# Patient Record
Sex: MALE | Race: WHITE | NOT HISPANIC OR LATINO
[De-identification: names, ages, dates, MRNs, and addresses within clinical notes are randomized per-mention and may not be internally consistent; named-entity substitution may affect disease eponyms.]

---

## 2017-05-03 ENCOUNTER — APPOINTMENT (OUTPATIENT)
Dept: PULMONOLOGY | Facility: CLINIC | Age: 77
End: 2017-05-03

## 2017-05-03 VITALS
DIASTOLIC BLOOD PRESSURE: 80 MMHG | HEART RATE: 78 BPM | HEIGHT: 62 IN | SYSTOLIC BLOOD PRESSURE: 140 MMHG | BODY MASS INDEX: 39.2 KG/M2 | OXYGEN SATURATION: 94 % | WEIGHT: 213 LBS

## 2017-05-03 VITALS — OXYGEN SATURATION: 98 %

## 2017-05-03 DIAGNOSIS — M12.9 ARTHROPATHY, UNSPECIFIED: ICD-10-CM

## 2017-05-03 DIAGNOSIS — Z00.00 ENCOUNTER FOR GENERAL ADULT MEDICAL EXAMINATION W/OUT ABNORMAL FINDINGS: ICD-10-CM

## 2017-06-30 ENCOUNTER — APPOINTMENT (OUTPATIENT)
Dept: PULMONOLOGY | Facility: CLINIC | Age: 77
End: 2017-06-30

## 2018-01-15 ENCOUNTER — APPOINTMENT (OUTPATIENT)
Dept: CARDIOLOGY | Facility: CLINIC | Age: 78
End: 2018-01-15
Payer: MEDICARE

## 2018-01-15 PROCEDURE — 93000 ELECTROCARDIOGRAM COMPLETE: CPT

## 2018-01-15 PROCEDURE — 99214 OFFICE O/P EST MOD 30 MIN: CPT

## 2018-04-27 ENCOUNTER — APPOINTMENT (OUTPATIENT)
Dept: CARDIOLOGY | Facility: CLINIC | Age: 78
End: 2018-04-27

## 2018-06-05 ENCOUNTER — RECORD ABSTRACTING (OUTPATIENT)
Age: 78
End: 2018-06-05

## 2018-06-05 DIAGNOSIS — M10.9 GOUT, UNSPECIFIED: ICD-10-CM

## 2018-06-05 RX ORDER — DICLOFENAC SODIUM 50 MG/1
50 TABLET, DELAYED RELEASE ORAL
Qty: 60 | Refills: 0 | Status: COMPLETED | COMMUNITY
Start: 2018-04-26

## 2018-06-05 RX ORDER — MEMANTINE HYDROCHLORIDE 10 MG/1
10 TABLET, FILM COATED ORAL TWICE DAILY
Refills: 0 | Status: ACTIVE | COMMUNITY
Start: 2016-11-30

## 2018-06-05 RX ORDER — DONEPEZIL HYDROCHLORIDE 10 MG/1
10 TABLET ORAL DAILY
Refills: 0 | Status: ACTIVE | COMMUNITY
Start: 2017-01-26

## 2018-06-06 ENCOUNTER — APPOINTMENT (OUTPATIENT)
Dept: CARDIOLOGY | Facility: CLINIC | Age: 78
End: 2018-06-06
Payer: MEDICARE

## 2018-06-06 VITALS
SYSTOLIC BLOOD PRESSURE: 100 MMHG | HEIGHT: 62 IN | BODY MASS INDEX: 39.75 KG/M2 | DIASTOLIC BLOOD PRESSURE: 58 MMHG | HEART RATE: 83 BPM | WEIGHT: 216 LBS

## 2018-06-06 PROCEDURE — 93000 ELECTROCARDIOGRAM COMPLETE: CPT

## 2018-06-06 PROCEDURE — 99214 OFFICE O/P EST MOD 30 MIN: CPT

## 2018-06-06 RX ORDER — ALLOPURINOL 100 MG/1
100 TABLET ORAL
Refills: 0 | Status: ACTIVE | COMMUNITY
Start: 2016-11-11

## 2018-11-05 ENCOUNTER — APPOINTMENT (OUTPATIENT)
Dept: CARDIOLOGY | Facility: CLINIC | Age: 78
End: 2018-11-05
Payer: MEDICARE

## 2018-11-05 VITALS
BODY MASS INDEX: 39.38 KG/M2 | WEIGHT: 214 LBS | HEART RATE: 61 BPM | SYSTOLIC BLOOD PRESSURE: 126 MMHG | RESPIRATION RATE: 16 BRPM | HEIGHT: 62 IN | DIASTOLIC BLOOD PRESSURE: 78 MMHG

## 2018-11-05 DIAGNOSIS — Z86.79 PERSONAL HISTORY OF OTHER DISEASES OF THE CIRCULATORY SYSTEM: ICD-10-CM

## 2018-11-05 PROCEDURE — 99214 OFFICE O/P EST MOD 30 MIN: CPT

## 2018-11-05 PROCEDURE — 93000 ELECTROCARDIOGRAM COMPLETE: CPT

## 2018-11-05 RX ORDER — OMEPRAZOLE 20 MG/1
20 CAPSULE, DELAYED RELEASE ORAL
Qty: 90 | Refills: 3 | Status: DISCONTINUED | COMMUNITY
Start: 2018-04-26 | End: 2018-11-05

## 2018-11-05 NOTE — REASON FOR VISIT
[Follow-Up - Clinic] : a clinic follow-up of [FreeTextEntry1] : The patient is an 78-year-old white male who comes accompanied with his wife today for general cardiac checkup. He reports that he has been occasionally short of breath during certain activities especially when going up the stairs and has been rather sedentary. He also has severe arthritides of lower extremities and ambulates with a cane and;\par \par He denies chest pain palpitations dizziness or syncope;

## 2018-11-05 NOTE — HISTORY OF PRESENT ILLNESS
[FreeTextEntry1] : His wife feels that his excessive weight has been contributing to his occasional exertional dyspnea and arthritides.;\par \par \par Past history also includes hyperlipidemia, heart murmur, and mild dementia;

## 2018-11-05 NOTE — PHYSICAL EXAM
[Normal Conjunctiva] : the conjunctiva exhibited no abnormalities [Eyelids - No Xanthelasma] : the eyelids demonstrated no xanthelasmas [Normal Oral Mucosa] : normal oral mucosa [No Oral Pallor] : no oral pallor [No Oral Cyanosis] : no oral cyanosis [Normal Jugular Venous A Waves Present] : normal jugular venous A waves present [Normal Jugular Venous V Waves Present] : normal jugular venous V waves present [No Jugular Venous Narayan A Waves] : no jugular venous narayan A waves [Respiration, Rhythm And Depth] : normal respiratory rhythm and effort [Exaggerated Use Of Accessory Muscles For Inspiration] : no accessory muscle use [Auscultation Breath Sounds / Voice Sounds] : lungs were clear to auscultation bilaterally [FreeTextEntry1] : surgical scars on both knees [Skin Color & Pigmentation] : normal skin color and pigmentation [] : no rash [No Venous Stasis] : no venous stasis [Skin Lesions] : no skin lesions [No Skin Ulcers] : no skin ulcer [No Xanthoma] : no  xanthoma was observed [Oriented To Time, Place, And Person] : oriented to person, place, and time [Affect] : the affect was normal [Mood] : the mood was normal [No Anxiety] : not feeling anxious

## 2018-11-05 NOTE — ASSESSMENT
[FreeTextEntry1] : EKG shows normal sinus rhythm rate 86 with some poor R wave progression V1 to V3 and nonspecific T-wave flattening in the inferior lateral leads;\par \par \par \par In summary the patient is an 78-year-old gentleman with history of heart murmur, occasional exertional dyspnea and borderline abnormal EKG who has significant arthritides which could be contributing to his exertional symptoms;\par \par Plan:\par \par Recommendation schedule transthoracic echocardiogram and carotid duplex study by next visit to assess cardiac function, valvulopathy and carotid plaquing stenosis;\par \par Okay to continue current medical regimen at this time;\par \par Counseled the patient on low-carb weight reducing diet and discussed with his wife as well;\par \par Followup within 4-5 months or p.r.n.;

## 2019-04-02 ENCOUNTER — APPOINTMENT (OUTPATIENT)
Dept: CARDIOLOGY | Facility: CLINIC | Age: 79
End: 2019-04-02
Payer: MEDICARE

## 2019-04-02 PROCEDURE — 93880 EXTRACRANIAL BILAT STUDY: CPT

## 2019-04-02 PROCEDURE — 93306 TTE W/DOPPLER COMPLETE: CPT

## 2019-04-15 ENCOUNTER — NON-APPOINTMENT (OUTPATIENT)
Age: 79
End: 2019-04-15

## 2019-04-15 ENCOUNTER — APPOINTMENT (OUTPATIENT)
Dept: CARDIOLOGY | Facility: CLINIC | Age: 79
End: 2019-04-15
Payer: MEDICARE

## 2019-04-15 VITALS
SYSTOLIC BLOOD PRESSURE: 144 MMHG | WEIGHT: 218 LBS | RESPIRATION RATE: 16 BRPM | HEIGHT: 62 IN | BODY MASS INDEX: 40.12 KG/M2 | HEART RATE: 57 BPM | DIASTOLIC BLOOD PRESSURE: 83 MMHG

## 2019-04-15 PROCEDURE — 93000 ELECTROCARDIOGRAM COMPLETE: CPT

## 2019-04-15 PROCEDURE — 99214 OFFICE O/P EST MOD 30 MIN: CPT

## 2019-04-15 NOTE — ASSESSMENT
[FreeTextEntry1] : EKG demonstrates normal sinus rhythm at a rate of 57 with some borderline nonspecific ST-T changes, nonacute;\par \par In summary the patient is an 78-year-old gentleman with a history of recent echocardiogram demonstrating biatrial enlargement with preserved LV systolic function and mild L. LVH;\par Trace TR MR and mild PI with mildly elevated PA pressures;\par \par Carotid duplex study demonstrating mild calcified plaque on the left system and moderate to large calcified plaque on the right system with no significant obstructive flow;\par \par Plan:\par \par Patient recommended to attempt modest walking exercise as tolerated\par \par No additional cardiac workup indicated at this time\par \par Patient encouraged to continue current medical regimen;\par \par Followup to this office within 5 months or p.r.n.;;;

## 2019-04-15 NOTE — HISTORY OF PRESENT ILLNESS
[FreeTextEntry1] : ;He is accompanied with his wife today also for an exa\par \par His wife states he is very sedentary and does not do hardly any physical activity ;\par \par He ambulates with a cane ;\par \par States he's been taking his medications regularly;

## 2019-04-15 NOTE — REASON FOR VISIT
[Follow-Up - Clinic] : a clinic follow-up of [FreeTextEntry1] : The patient is an 78-year-old gentleman who presents back to the office today for general cardiac checkup. He has a known history of some exertional dyspnea at times but diffuse arthritides and lower extremities and obesity. He denies any significant symptoms of chest pain, palpitations, dizziness or syncope;

## 2019-04-15 NOTE — REVIEW OF SYSTEMS
[Shortness Of Breath] : shortness of breath [Lower Ext Edema] : lower extremity edema [Joint Pain] : joint pain [Joint Swelling] : joint swelling [Joint Stiffness] : joint stiffness [Limb Weakness (Paresis)] : limb weakness [Negative] : Heme/Lymph

## 2019-05-29 ENCOUNTER — APPOINTMENT (OUTPATIENT)
Dept: PULMONOLOGY | Facility: CLINIC | Age: 79
End: 2019-05-29
Payer: MEDICARE

## 2019-05-29 VITALS
OXYGEN SATURATION: 96 % | HEART RATE: 74 BPM | SYSTOLIC BLOOD PRESSURE: 140 MMHG | DIASTOLIC BLOOD PRESSURE: 70 MMHG | HEIGHT: 62 IN | BODY MASS INDEX: 40.48 KG/M2 | WEIGHT: 220 LBS

## 2019-05-29 PROCEDURE — 99214 OFFICE O/P EST MOD 30 MIN: CPT | Mod: 25

## 2019-05-29 PROCEDURE — 94010 BREATHING CAPACITY TEST: CPT

## 2019-08-06 ENCOUNTER — APPOINTMENT (OUTPATIENT)
Dept: CARDIOLOGY | Facility: CLINIC | Age: 79
End: 2019-08-06

## 2019-10-29 ENCOUNTER — APPOINTMENT (OUTPATIENT)
Dept: PULMONOLOGY | Facility: CLINIC | Age: 79
End: 2019-10-29

## 2021-01-11 ENCOUNTER — NON-APPOINTMENT (OUTPATIENT)
Age: 81
End: 2021-01-11

## 2021-01-18 ENCOUNTER — APPOINTMENT (OUTPATIENT)
Dept: CARDIOLOGY | Facility: CLINIC | Age: 81
End: 2021-01-18
Payer: MEDICARE

## 2021-01-18 ENCOUNTER — NON-APPOINTMENT (OUTPATIENT)
Age: 81
End: 2021-01-18

## 2021-01-18 VITALS
RESPIRATION RATE: 16 BRPM | BODY MASS INDEX: 41.77 KG/M2 | SYSTOLIC BLOOD PRESSURE: 130 MMHG | HEART RATE: 78 BPM | OXYGEN SATURATION: 96 % | TEMPERATURE: 95.3 F | DIASTOLIC BLOOD PRESSURE: 72 MMHG | HEIGHT: 62 IN | WEIGHT: 227 LBS

## 2021-01-18 PROCEDURE — 99214 OFFICE O/P EST MOD 30 MIN: CPT

## 2021-01-18 PROCEDURE — 93000 ELECTROCARDIOGRAM COMPLETE: CPT

## 2021-01-18 RX ORDER — RISPERIDONE 0.25 MG/1
0.25 TABLET, FILM COATED ORAL
Refills: 0 | Status: DISCONTINUED | COMMUNITY
Start: 2016-08-23 | End: 2021-01-18

## 2021-01-18 RX ORDER — SIMVASTATIN 20 MG/1
20 TABLET, FILM COATED ORAL
Qty: 90 | Refills: 3 | Status: DISCONTINUED | COMMUNITY
Start: 2016-03-01 | End: 2021-01-18

## 2021-01-18 RX ORDER — SERTRALINE 25 MG/1
25 TABLET, FILM COATED ORAL TWICE DAILY
Refills: 0 | Status: DISCONTINUED | COMMUNITY
Start: 2016-01-11 | End: 2021-01-18

## 2021-01-18 RX ORDER — LISINOPRIL 10 MG/1
10 TABLET ORAL
Qty: 30 | Refills: 0 | Status: DISCONTINUED | COMMUNITY
Start: 2021-01-06

## 2021-01-18 RX ORDER — ENALAPRIL MALEATE 10 MG/1
10 TABLET ORAL
Refills: 0 | Status: DISCONTINUED | COMMUNITY
Start: 2016-10-03 | End: 2021-01-18

## 2021-01-18 NOTE — REASON FOR VISIT
[FreeTextEntry1] : LILIANA FELDER is being seen for a clinic follow-up of. \par \par The patient is an 80-year-old gentleman who presents back to the office today for general cardiac checkup. He has a known history of some exertional dyspnea at times with diffuse arthritides and lower extremities and obesity.\par \par He is accompanied with his wife today reporting he continues to experience exertional shortness of breath mostly unchanged from his baseline.  He also notices some "wheezing" at times as well.  In addition, he has noticed some worsening of his lower extremities as well.\par \par His PCP (Dr. Ruffin) had recently switched the Enalapril with Lisinopril 10 mg.  He had also stopped his HCTZ 12.5 mg many months ago for a reason that is unknown.\par \par Patient denies associated symptoms such as CP, PND, orthopnea, palpitations, presyncope, syncope.

## 2021-01-18 NOTE — ASSESSMENT
[FreeTextEntry1] : EKG 1/18/2021:  The EKG illustrates sinus rhythm, rate of 78 bpm, nonspecific T wave abnormality, early R wave transition V1 to V2.  Essentially unchanged.\par \par Most recent Blood work (9/25/2020):  Creatinine (1.12) BUN (17), Potassium (4.3), Sodium (139), AST (20), ALT (16), Total Cholesterol (176), LDL (97), HDL (38), Triglycerides (203).\par \par

## 2021-01-18 NOTE — DISCUSSION/SUMMARY
[FreeTextEntry1] : 1).  Patient's lower extremities are now slightly edematous bilaterally;  he will begin taking Torsemide 20 mg daily for one week and then QOD after that.\par \par He will complete updated blood work including renal function and electrolytes one week prior to office follow up.  May try to have lab draw blood from his home for easier access. \par \par 2).  He is to complete a Transthoracic Echocardiogram in the near future to assess overall cardiac function, valvulopathy, signs of cardiomyopathy and pericardial effusion.\par \par 3).  Strongly encouraged follow up with Pulmonologist (Dr. Thrasher) tomorrow for evaluation and management of any lung related issues. ?chest xray, ?PFT's, ?medication.\par \par 4).  Immediately go to the emergency department and/or report any untoward symptoms to our office.\par \par 5).  Follow up with our office in 6 to 8 weeks or PRN.

## 2021-01-18 NOTE — PHYSICAL EXAM
[Normal Appearance] : normal appearance [General Appearance - In No Acute Distress] : no acute distress [Normal Conjunctiva] : the conjunctiva exhibited no abnormalities [Normal Oropharynx] : normal oropharynx [Normal Oral Mucosa] : normal oral mucosa [Normal Jugular Venous A Waves Present] : normal jugular venous A waves present [Normal Jugular Venous V Waves Present] : normal jugular venous V waves present [No Jugular Venous Narayan A Waves] : no jugular venous narayan A waves [] : no respiratory distress [Respiration, Rhythm And Depth] : normal respiratory rhythm and effort [Auscultation Breath Sounds / Voice Sounds] : lungs were clear to auscultation bilaterally [Heart Rate And Rhythm] : heart rate and rhythm were normal [Heart Sounds] : normal S1 and S2 [Bowel Sounds] : normal bowel sounds [Cyanosis, Localized] : no localized cyanosis [Nail Clubbing] : no clubbing of the fingernails [Skin Color & Pigmentation] : normal skin color and pigmentation [Oriented To Time, Place, And Person] : oriented to person, place, and time [Impaired Insight] : insight and judgment were intact [Affect] : the affect was normal [FreeTextEntry1] : use wheel chair at times and/or cane.

## 2021-01-19 ENCOUNTER — APPOINTMENT (OUTPATIENT)
Dept: PULMONOLOGY | Facility: CLINIC | Age: 81
End: 2021-01-19
Payer: MEDICARE

## 2021-01-19 VITALS
WEIGHT: 227 LBS | HEART RATE: 59 BPM | SYSTOLIC BLOOD PRESSURE: 140 MMHG | HEIGHT: 62 IN | DIASTOLIC BLOOD PRESSURE: 78 MMHG | OXYGEN SATURATION: 97 % | BODY MASS INDEX: 41.77 KG/M2

## 2021-01-19 DIAGNOSIS — Z23 ENCOUNTER FOR IMMUNIZATION: ICD-10-CM

## 2021-01-19 PROCEDURE — 99215 OFFICE O/P EST HI 40 MIN: CPT

## 2021-01-19 RX ORDER — ALBUTEROL SULFATE 2.5 MG/3ML
(2.5 MG/3ML) SOLUTION RESPIRATORY (INHALATION)
Qty: 3 | Refills: 3 | Status: ACTIVE | COMMUNITY
Start: 2021-01-19 | End: 1900-01-01

## 2021-01-19 RX ORDER — SERTRALINE HYDROCHLORIDE 50 MG/1
50 TABLET, FILM COATED ORAL
Qty: 90 | Refills: 0 | Status: ACTIVE | COMMUNITY
Start: 2020-07-20

## 2021-01-19 RX ORDER — RISPERIDONE 0.5 MG/1
0.5 TABLET, FILM COATED ORAL
Qty: 90 | Refills: 0 | Status: ACTIVE | COMMUNITY
Start: 2020-12-22

## 2021-01-28 LAB
DEPRECATED D DIMER PPP IA-ACNC: 211 NG/ML DDU
SARS-COV-2 N GENE NPH QL NAA+PROBE: NOT DETECTED

## 2021-02-02 ENCOUNTER — APPOINTMENT (OUTPATIENT)
Dept: DISASTER EMERGENCY | Facility: CLINIC | Age: 81
End: 2021-02-02

## 2021-02-05 ENCOUNTER — APPOINTMENT (OUTPATIENT)
Dept: PULMONOLOGY | Facility: CLINIC | Age: 81
End: 2021-02-05

## 2021-02-05 ENCOUNTER — APPOINTMENT (OUTPATIENT)
Dept: PULMONOLOGY | Facility: CLINIC | Age: 81
End: 2021-02-05
Payer: MEDICARE

## 2021-02-05 VITALS
DIASTOLIC BLOOD PRESSURE: 72 MMHG | SYSTOLIC BLOOD PRESSURE: 122 MMHG | HEART RATE: 87 BPM | WEIGHT: 223 LBS | BODY MASS INDEX: 40.79 KG/M2 | OXYGEN SATURATION: 98 %

## 2021-02-05 DIAGNOSIS — K14.8 OTHER DISEASES OF TONGUE: ICD-10-CM

## 2021-02-05 PROCEDURE — 99214 OFFICE O/P EST MOD 30 MIN: CPT

## 2021-02-05 RX ORDER — BUDESONIDE 0.5 MG/2ML
0.5 INHALANT ORAL TWICE DAILY
Qty: 12 | Refills: 5 | Status: ACTIVE | COMMUNITY
Start: 2021-02-05 | End: 1900-01-01

## 2021-02-17 ENCOUNTER — APPOINTMENT (OUTPATIENT)
Dept: DISASTER EMERGENCY | Facility: CLINIC | Age: 81
End: 2021-02-17

## 2021-02-18 LAB — SARS-COV-2 N GENE NPH QL NAA+PROBE: NOT DETECTED

## 2021-02-19 ENCOUNTER — APPOINTMENT (OUTPATIENT)
Dept: PULMONOLOGY | Facility: CLINIC | Age: 81
End: 2021-02-19
Payer: MEDICARE

## 2021-02-19 VITALS — OXYGEN SATURATION: 96 % | HEART RATE: 76 BPM | DIASTOLIC BLOOD PRESSURE: 70 MMHG | SYSTOLIC BLOOD PRESSURE: 116 MMHG

## 2021-02-19 VITALS — BODY MASS INDEX: 42.07 KG/M2 | TEMPERATURE: 98 F | WEIGHT: 230 LBS

## 2021-02-19 PROCEDURE — 94727 GAS DIL/WSHOT DETER LNG VOL: CPT

## 2021-02-19 PROCEDURE — 94010 BREATHING CAPACITY TEST: CPT

## 2021-02-19 PROCEDURE — 99214 OFFICE O/P EST MOD 30 MIN: CPT | Mod: 25

## 2021-02-19 PROCEDURE — 94729 DIFFUSING CAPACITY: CPT

## 2021-02-19 PROCEDURE — 85018 HEMOGLOBIN: CPT | Mod: QW

## 2021-02-24 ENCOUNTER — APPOINTMENT (OUTPATIENT)
Dept: CARDIOLOGY | Facility: CLINIC | Age: 81
End: 2021-02-24
Payer: MEDICARE

## 2021-02-24 PROCEDURE — 93306 TTE W/DOPPLER COMPLETE: CPT

## 2021-02-24 RX ORDER — PERFLUTREN 6.52 MG/ML
6.52 INJECTION, SUSPENSION INTRAVENOUS
Qty: 2 | Refills: 0 | Status: COMPLETED | OUTPATIENT
Start: 2021-02-24

## 2021-02-24 RX ADMIN — PERFLUTREN MG/ML: 6.52 INJECTION, SUSPENSION INTRAVENOUS at 00:00

## 2021-02-25 ENCOUNTER — LABORATORY RESULT (OUTPATIENT)
Age: 81
End: 2021-02-25

## 2021-03-24 ENCOUNTER — APPOINTMENT (OUTPATIENT)
Dept: CARDIOLOGY | Facility: CLINIC | Age: 81
End: 2021-03-24
Payer: MEDICARE

## 2021-03-24 ENCOUNTER — NON-APPOINTMENT (OUTPATIENT)
Age: 81
End: 2021-03-24

## 2021-03-24 VITALS
SYSTOLIC BLOOD PRESSURE: 114 MMHG | DIASTOLIC BLOOD PRESSURE: 78 MMHG | HEART RATE: 64 BPM | WEIGHT: 223 LBS | HEIGHT: 62 IN | BODY MASS INDEX: 41.04 KG/M2 | TEMPERATURE: 97.2 F | RESPIRATION RATE: 16 BRPM

## 2021-03-24 DIAGNOSIS — J98.6 DISORDERS OF DIAPHRAGM: ICD-10-CM

## 2021-03-24 PROCEDURE — 99214 OFFICE O/P EST MOD 30 MIN: CPT

## 2021-03-24 PROCEDURE — 93000 ELECTROCARDIOGRAM COMPLETE: CPT

## 2021-03-24 NOTE — REASON FOR VISIT
[Follow-Up - Clinic] : a clinic follow-up of [FreeTextEntry1] : The patient is an 80-year-old white male with a known history for mild dementia and underlying obstructive sleep apnea (no CPAP), obesity with pulmonary hypertension, and chronic complaints of exertional dyspnea.\par \par He and his wife reports some audible wheezing when he tries to do any meaningful walking or activity;\par \par Yet, there has been no PND or orthopnea;\par He denies chest pain, palpitations, dizziness or syncope;\par

## 2021-03-24 NOTE — ASSESSMENT
[FreeTextEntry1] : EKG demonstrating normal sinus rhythm at a rate of 64. Nonspecific T-wave changes in leads 3 and aVF;  no acute change;\par \par In summary this 80-year-old gentleman has a history of some mild dementia and obesity with exertional dyspnea which could be multifactorial in origin including his chronic arthritides and lower spinal pain which makes it difficult for this patient to ambulate bringing on more shortness of breath;\par \par Plan:\par \par Emphasis at this time would be to treat his lower back pain syndrome and efforts at helping him reduce some of his weight;\par \par Doubt significant cardiac cause of this, however could consider cardiac catheterization with right and left heart cath-if patient and his wife are agreeable in the future and symptoms take a worsening course;\par \par Will continue to monitor patient on periodic basis in the office;

## 2021-03-24 NOTE — HISTORY OF PRESENT ILLNESS
[FreeTextEntry1] : He uses bronchodilators and nebulizers only intermittently at home;\par He was recommended to use O2 nasal cannula at times of exertion but does not seem to use it much;\par Has been seeing Dr. Thrasher from the pulmonary standpoint periodically;\par \par Cardiac workup in the past: Shows recent echo from 2/24/21 with preserved systolic function of the left ventricle with mild LVH and EF range of 55-60%; no evidence of diastolic dysfunction; moderate aortic sclerosis without stenosis. Trace MR and TR. No pericardial effusion;\par \par Last nuclear stress test in 2017 was negative for ischemia on the myocardial perfusion images;\par

## 2021-03-24 NOTE — REVIEW OF SYSTEMS
[Recent Weight Gain (___ Lbs)] : recent [unfilled] ~Ulb weight gain [Dyspnea on exertion] : dyspnea during exertion [Wheezing] : wheezing [Negative] : Heme/Lymph

## 2021-04-27 ENCOUNTER — APPOINTMENT (OUTPATIENT)
Dept: PULMONOLOGY | Facility: CLINIC | Age: 81
End: 2021-04-27
Payer: MEDICARE

## 2021-04-27 DIAGNOSIS — R06.1 STRIDOR: ICD-10-CM

## 2021-04-27 PROCEDURE — 99443: CPT | Mod: 95

## 2021-05-18 PROBLEM — R91.1 LUNG NODULE: Status: ACTIVE | Noted: 2021-05-18

## 2021-05-24 ENCOUNTER — APPOINTMENT (OUTPATIENT)
Dept: THORACIC SURGERY | Facility: CLINIC | Age: 81
End: 2021-05-24
Payer: MEDICARE

## 2021-05-24 VITALS
WEIGHT: 225 LBS | DIASTOLIC BLOOD PRESSURE: 76 MMHG | HEART RATE: 69 BPM | RESPIRATION RATE: 16 BRPM | HEIGHT: 60 IN | SYSTOLIC BLOOD PRESSURE: 123 MMHG | OXYGEN SATURATION: 96 % | BODY MASS INDEX: 44.17 KG/M2

## 2021-05-24 DIAGNOSIS — R91.1 SOLITARY PULMONARY NODULE: ICD-10-CM

## 2021-05-24 PROCEDURE — 99204 OFFICE O/P NEW MOD 45 MIN: CPT

## 2021-05-24 NOTE — REVIEW OF SYSTEMS
[Feeling Poorly] : feeling poorly [Feeling Tired] : feeling tired [Lower Ext Edema] : lower extremity edema [Shortness Of Breath] : shortness of breath [SOB on Exertion] : shortness of breath during exertion [Negative] : Heme/Lymph [Chest Pain] : no chest pain [Palpitations] : no palpitations [Cough] : no cough

## 2021-05-24 NOTE — CONSULT LETTER
[Dear  ___] : Dear  [unfilled], [Courtesy Letter:] : I had the pleasure of seeing your patient, [unfilled], in my office today. [Please see my note below.] : Please see my note below. [Consult Closing:] : Thank you very much for allowing me to participate in the care of this patient.  If you have any questions, please do not hesitate to contact me. [Sincerely,] : Sincerely, [FreeTextEntry2] : Dr. Jack Thrasher  [FreeTextEntry3] : Michel Miller MD\par Department of Cardiovascular and Thoracic Surgery\par \par [employer] [employer] [facility]

## 2021-05-24 NOTE — ASSESSMENT
[FreeTextEntry1] : Ramos is an 80-year-old male with a history of shortness of breath on exertion recently found to have an elevated right hemidiaphragm. There may also be some bronchial narrowing in the lower lobe. I do believe bronchoscopy for evaluation of the airways as appropriate and he will be scheduled shortly.\par \par Thank you for allowing me to participate in the care of your patient.\par \par 45 minutes was spent during this encounter.\par \par Michel Miller MD\par Department of Cardiovascular and Thoracic Surgery\par \par [employer] [employer] [facility]\par

## 2021-05-24 NOTE — PHYSICAL EXAM
[General Appearance - Alert] : alert [General Appearance - In No Acute Distress] : in no acute distress [FreeTextEntry1] : He is diminished on the right [Heart Rate And Rhythm] : heart rate was normal and rhythm regular [Heart Sounds] : normal S1 and S2 [Heart Sounds Gallop] : no gallops [Murmurs] : no murmurs [Heart Sounds Pericardial Friction Rub] : no pericardial rub [Examination Of The Chest] : the chest was normal in appearance [Chest Visual Inspection Thoracic Asymmetry] : no chest asymmetry [Diminished Respiratory Excursion] : normal chest expansion [No Focal Deficits] : no focal deficits [Oriented To Time, Place, And Person] : oriented to person, place, and time [Impaired Insight] : insight and judgment were intact [Affect] : the affect was normal

## 2021-05-24 NOTE — HISTORY OF PRESENT ILLNESS
[FreeTextEntry1] : Mr. FELDER is a 80 year old male referred by Dr.Charles Thrasher for a abnormal  5.6.21 Non- Contrast CT Chest Scan from [facility]. This demonstrated a elevation of the  right hemidiaphragm with associated compressive atelectasis seen in the right middle lobe and at the right lung base. She  is here to discuss candidacy for a Flexible Bronchoscopy.

## 2021-05-24 NOTE — DATA REVIEWED
[FreeTextEntry1] : 5.6.21 Non- Contrast CT Chest Scan from [facility] \par - Elevation of the right hemidiaphragm again seen with associated compressive atelectasis seen in the right middle lobe and at the right lung base. These findings appear unchanged compared with the prior study \par \par 2.19.21 PFTs from [facility] [place] \par -FEV1 81.1\par \par 1.22.21 Non- Contrast CT Chest Scan from [facility] \par -Somewhat limited  examination  due to motion artifact \par -Diffuse bronchial wall thickening as well inflammatory airway disease. Apparent  mild narrowing of  the right middle lobe bronchus, possibly related to shallow  inspiratory effort and  elevation  of the right hemidiaphragm. Minimal soft tissue attenuation  right lower lobe bronchus, probably related  to secretions \par -Subsegmental consolidation  right middle lobe  and minimally in the right lower lobe likely related to  compressive atelectasis \par -This is also in the inferior lingular lobe \par \par

## 2021-06-02 ENCOUNTER — OUTPATIENT (OUTPATIENT)
Dept: OUTPATIENT SERVICES | Facility: HOSPITAL | Age: 81
LOS: 1 days | End: 2021-06-02
Payer: MEDICARE

## 2021-06-02 VITALS
DIASTOLIC BLOOD PRESSURE: 74 MMHG | SYSTOLIC BLOOD PRESSURE: 120 MMHG | HEART RATE: 88 BPM | HEIGHT: 60 IN | TEMPERATURE: 97 F | WEIGHT: 223.55 LBS | RESPIRATION RATE: 20 BRPM

## 2021-06-02 DIAGNOSIS — Z01.818 ENCOUNTER FOR OTHER PREPROCEDURAL EXAMINATION: ICD-10-CM

## 2021-06-02 DIAGNOSIS — Z13.89 ENCOUNTER FOR SCREENING FOR OTHER DISORDER: ICD-10-CM

## 2021-06-02 DIAGNOSIS — Z29.9 ENCOUNTER FOR PROPHYLACTIC MEASURES, UNSPECIFIED: ICD-10-CM

## 2021-06-02 DIAGNOSIS — Z98.49 CATARACT EXTRACTION STATUS, UNSPECIFIED EYE: Chronic | ICD-10-CM

## 2021-06-02 DIAGNOSIS — R91.1 SOLITARY PULMONARY NODULE: ICD-10-CM

## 2021-06-02 DIAGNOSIS — Z90.49 ACQUIRED ABSENCE OF OTHER SPECIFIED PARTS OF DIGESTIVE TRACT: Chronic | ICD-10-CM

## 2021-06-02 DIAGNOSIS — I10 ESSENTIAL (PRIMARY) HYPERTENSION: ICD-10-CM

## 2021-06-02 DIAGNOSIS — Z96.659 PRESENCE OF UNSPECIFIED ARTIFICIAL KNEE JOINT: Chronic | ICD-10-CM

## 2021-06-02 LAB
ALBUMIN SERPL ELPH-MCNC: 4 G/DL — SIGNIFICANT CHANGE UP (ref 3.3–5.2)
ALP SERPL-CCNC: 58 U/L — SIGNIFICANT CHANGE UP (ref 40–120)
ALT FLD-CCNC: 17 U/L — SIGNIFICANT CHANGE UP
ANION GAP SERPL CALC-SCNC: 9 MMOL/L — SIGNIFICANT CHANGE UP (ref 5–17)
APTT BLD: 30.6 SEC — SIGNIFICANT CHANGE UP (ref 27.5–35.5)
AST SERPL-CCNC: 22 U/L — SIGNIFICANT CHANGE UP
BASOPHILS # BLD AUTO: 0.03 K/UL — SIGNIFICANT CHANGE UP (ref 0–0.2)
BASOPHILS NFR BLD AUTO: 0.4 % — SIGNIFICANT CHANGE UP (ref 0–2)
BILIRUB SERPL-MCNC: 0.3 MG/DL — LOW (ref 0.4–2)
BUN SERPL-MCNC: 44.2 MG/DL — HIGH (ref 8–20)
CALCIUM SERPL-MCNC: 9.1 MG/DL — SIGNIFICANT CHANGE UP (ref 8.6–10.2)
CHLORIDE SERPL-SCNC: 101 MMOL/L — SIGNIFICANT CHANGE UP (ref 98–107)
CO2 SERPL-SCNC: 28 MMOL/L — SIGNIFICANT CHANGE UP (ref 22–29)
CREAT SERPL-MCNC: 1.74 MG/DL — HIGH (ref 0.5–1.3)
EOSINOPHIL # BLD AUTO: 0.46 K/UL — SIGNIFICANT CHANGE UP (ref 0–0.5)
EOSINOPHIL NFR BLD AUTO: 6.6 % — HIGH (ref 0–6)
GLUCOSE SERPL-MCNC: 108 MG/DL — HIGH (ref 70–99)
HCT VFR BLD CALC: 41.1 % — SIGNIFICANT CHANGE UP (ref 39–50)
HGB BLD-MCNC: 13.4 G/DL — SIGNIFICANT CHANGE UP (ref 13–17)
IMM GRANULOCYTES NFR BLD AUTO: 0.4 % — SIGNIFICANT CHANGE UP (ref 0–1.5)
INR BLD: 0.98 RATIO — SIGNIFICANT CHANGE UP (ref 0.88–1.16)
LYMPHOCYTES # BLD AUTO: 2.68 K/UL — SIGNIFICANT CHANGE UP (ref 1–3.3)
LYMPHOCYTES # BLD AUTO: 38.6 % — SIGNIFICANT CHANGE UP (ref 13–44)
MCHC RBC-ENTMCNC: 30 PG — SIGNIFICANT CHANGE UP (ref 27–34)
MCHC RBC-ENTMCNC: 32.6 GM/DL — SIGNIFICANT CHANGE UP (ref 32–36)
MCV RBC AUTO: 91.9 FL — SIGNIFICANT CHANGE UP (ref 80–100)
MONOCYTES # BLD AUTO: 0.38 K/UL — SIGNIFICANT CHANGE UP (ref 0–0.9)
MONOCYTES NFR BLD AUTO: 5.5 % — SIGNIFICANT CHANGE UP (ref 2–14)
NEUTROPHILS # BLD AUTO: 3.36 K/UL — SIGNIFICANT CHANGE UP (ref 1.8–7.4)
NEUTROPHILS NFR BLD AUTO: 48.5 % — SIGNIFICANT CHANGE UP (ref 43–77)
PLATELET # BLD AUTO: 175 K/UL — SIGNIFICANT CHANGE UP (ref 150–400)
POTASSIUM SERPL-MCNC: 4.9 MMOL/L — SIGNIFICANT CHANGE UP (ref 3.5–5.3)
POTASSIUM SERPL-SCNC: 4.9 MMOL/L — SIGNIFICANT CHANGE UP (ref 3.5–5.3)
PROT SERPL-MCNC: 6.8 G/DL — SIGNIFICANT CHANGE UP (ref 6.6–8.7)
PROTHROM AB SERPL-ACNC: 11.4 SEC — SIGNIFICANT CHANGE UP (ref 10.6–13.6)
RBC # BLD: 4.47 M/UL — SIGNIFICANT CHANGE UP (ref 4.2–5.8)
RBC # FLD: 13.5 % — SIGNIFICANT CHANGE UP (ref 10.3–14.5)
SODIUM SERPL-SCNC: 138 MMOL/L — SIGNIFICANT CHANGE UP (ref 135–145)
WBC # BLD: 6.94 K/UL — SIGNIFICANT CHANGE UP (ref 3.8–10.5)
WBC # FLD AUTO: 6.94 K/UL — SIGNIFICANT CHANGE UP (ref 3.8–10.5)

## 2021-06-02 PROCEDURE — 93005 ELECTROCARDIOGRAM TRACING: CPT

## 2021-06-02 PROCEDURE — 85025 COMPLETE CBC W/AUTO DIFF WBC: CPT

## 2021-06-02 PROCEDURE — 36415 COLL VENOUS BLD VENIPUNCTURE: CPT

## 2021-06-02 PROCEDURE — 80053 COMPREHEN METABOLIC PANEL: CPT

## 2021-06-02 PROCEDURE — 85610 PROTHROMBIN TIME: CPT

## 2021-06-02 PROCEDURE — 93010 ELECTROCARDIOGRAM REPORT: CPT

## 2021-06-02 PROCEDURE — G0463: CPT

## 2021-06-02 PROCEDURE — 85730 THROMBOPLASTIN TIME PARTIAL: CPT

## 2021-06-02 RX ORDER — SIMVASTATIN 20 MG/1
1 TABLET, FILM COATED ORAL
Qty: 0 | Refills: 0 | DISCHARGE

## 2021-06-02 RX ORDER — MELOXICAM 15 MG/1
1 TABLET ORAL
Qty: 0 | Refills: 0 | DISCHARGE

## 2021-06-02 RX ORDER — DONEPEZIL HYDROCHLORIDE 10 MG/1
1 TABLET, FILM COATED ORAL
Qty: 0 | Refills: 0 | DISCHARGE

## 2021-06-02 RX ORDER — ALLOPURINOL 300 MG
1 TABLET ORAL
Qty: 0 | Refills: 0 | DISCHARGE

## 2021-06-02 RX ORDER — TERAZOSIN HYDROCHLORIDE 10 MG/1
1 CAPSULE ORAL
Qty: 0 | Refills: 0 | DISCHARGE

## 2021-06-02 RX ORDER — ASPIRIN/CALCIUM CARB/MAGNESIUM 324 MG
1 TABLET ORAL
Qty: 0 | Refills: 0 | DISCHARGE

## 2021-06-02 RX ORDER — RISPERIDONE 4 MG/1
1 TABLET ORAL
Qty: 0 | Refills: 0 | DISCHARGE

## 2021-06-02 RX ORDER — OMEGA-3 ACID ETHYL ESTERS 1 G
0 CAPSULE ORAL
Qty: 0 | Refills: 0 | DISCHARGE

## 2021-06-02 RX ORDER — SERTRALINE 25 MG/1
1 TABLET, FILM COATED ORAL
Qty: 0 | Refills: 0 | DISCHARGE

## 2021-06-02 RX ORDER — OMEPRAZOLE 10 MG/1
1 CAPSULE, DELAYED RELEASE ORAL
Qty: 0 | Refills: 0 | DISCHARGE

## 2021-06-02 RX ORDER — MEMANTINE HYDROCHLORIDE 10 MG/1
0 TABLET ORAL
Qty: 0 | Refills: 0 | DISCHARGE

## 2021-06-02 RX ORDER — TAMSULOSIN HYDROCHLORIDE 0.4 MG/1
1 CAPSULE ORAL
Qty: 0 | Refills: 0 | DISCHARGE

## 2021-06-02 NOTE — ASU PATIENT PROFILE, ADULT - LEARNING ASSESSMENT (PATIENT) ADDITIONAL COMMENTS
NP, instructed pt and wife, on pre-op instructions/teaching, tips for safer surgery, pain management scale, pt and wife verbalized understanding of all instructions given.

## 2021-06-02 NOTE — H&P PST ADULT - NSICDXFAMILYHX_GEN_ALL_CORE_FT
FAMILY HISTORY:  Father  Still living? Unknown  FH: diabetes mellitus, Age at diagnosis: Age Unknown    Sibling  Still living? Unknown  FH: breast cancer, Age at diagnosis: Age Unknown

## 2021-06-02 NOTE — H&P PST ADULT - NEGATIVE GASTROINTESTINAL SYMPTOMS
no nausea/no vomiting/no diarrhea/no constipation/no change in bowel habits/no abdominal pain/no melena/no hematochezia

## 2021-06-02 NOTE — H&P PST ADULT - NSICDXPROBLEM_GEN_ALL_CORE_FT
PROBLEM DIAGNOSES  Problem: Hypertension  Assessment and Plan: preop assessment, follow with PCP, medical clearance pending     Problem: Solitary pulmonary nodule  Assessment and Plan: preop assessment, medical clearance pending, flex bronch on 6/9    Problem: Screening for substance abuse  Assessment and Plan: ort score 1, low risk for substance abuse    Problem: Need for prophylactic measure  Assessment and Plan: caprini score 5, moderate risk for dvt, SCD ordered, surgical team to assess for dvt prophylaxis

## 2021-06-02 NOTE — ASU PATIENT PROFILE, ADULT - PAIN RATING AT ACTIVITY
Dr. Muhammad (Nephrology)  Office [phone]  Cell [phone]  [facility]  Cell [phone]      Patient is a 67y old  Female who presents with a chief complaint of     Patient seen and examined at bedside. No chest pain/sob    VITALS:  T(F): 97.2 (10-20-17 @ 05:24), Max: 99.1 (10-19-17 @ 22:21)  HR: 79 (10-20-17 @ 05:24)  BP: 117/60 (10-20-17 @ 05:24)  RR: 18 (10-20-17 @ 05:24)  SpO2: 98% (10-20-17 @ 05:24)  Wt(kg): --        PHYSICAL EXAM:  Constitutional: NAD  Neck: No JVD  Respiratory: CTAB, no wheezes, rales or rhonchi  Cardiovascular: S1, S2, RRR  Gastrointestinal: BS+, soft, NT/ND  Extremities: No peripheral edema    Hospital Medications:   MEDICATIONS  (STANDING):  acetylcysteine  Oral Solution 1200 milliGRAM(s) Oral every 12 hours  aspirin enteric coated 81 milliGRAM(s) Oral daily  atorvastatin 40 milliGRAM(s) Oral at bedtime  dextrose 5%. 1000 milliLiter(s) (50 mL/Hr) IV Continuous <Continuous>  dextrose 50% Injectable 12.5 Gram(s) IV Push once  dextrose 50% Injectable 25 Gram(s) IV Push once  dextrose 50% Injectable 25 Gram(s) IV Push once  docusate sodium 100 milliGRAM(s) Oral daily  famotidine    Tablet 20 milliGRAM(s) Oral daily  influenza   Vaccine 0.5 milliLiter(s) IntraMuscular once  insulin glargine Injectable (LANTUS) 65 Unit(s) SubCutaneous at bedtime  insulin lispro (HumaLOG) corrective regimen sliding scale   SubCutaneous Before meals and at bedtime  insulin lispro Injectable (HumaLOG) 24 Unit(s) SubCutaneous three times a day with meals  levothyroxine 50 MICROGram(s) Oral daily  metoprolol 25 milliGRAM(s) Oral two times a day  montelukast 10 milliGRAM(s) Oral at bedtime  senna 2 Tablet(s) Oral at bedtime  sodium bicarbonate  Infusion 0.23 mEq/kG/Hr (75 mL/Hr) IV Continuous <Continuous>  sodium bicarbonate  Infusion 0.23 mEq/kG/Hr (75 mL/Hr) IV Continuous <Continuous>  sodium chloride 0.9% lock flush 3 milliLiter(s) IV Push every 8 hours  sodium chloride 0.9%. 500 milliLiter(s) (100 mL/Hr) IV Continuous <Continuous>      LABS:  10-20    142  |  104  |  39<H>  ----------------------------<  143<H>  4.0   |  21<L>  |  1.70<H>    Ca    9.8      20 Oct 2017 05:35    TPro  7.2  /  Alb  3.6  /  TBili  0.6  /  DBili  0.1  /  AST  43<H>  /  ALT  30  /  AlkPhos  72  10-19    Creatinine Trend: 1.70 <--, 1.34 <--, 1.63 <--, 1.76 <--, 1.81 <--, 1.71 <--, 1.49 <--                                10.5   13.54 )-----------( 388      ( 20 Oct 2017 05:35 )             33.3     Urine Studies:  Urinalysis - [10-18-17 @ 11:15]      Color PLYEL / Appearance CLEAR / SG 1.014 / pH 6.5      Gluc 500 / Ketone NEGATIVE  / Bili NEGATIVE / Urobili NORMAL       Blood NEGATIVE / Protein 100 / Leuk Est NEGATIVE / Nitrite NEGATIVE      RBC 0-2 / WBC 0-2 / Hyaline  / Gran  / Sq Epi OCC / Non Sq Epi  / Bacteria       HbA1c 10.4      [10-13-17 @ 08:45]        RADIOLOGY & ADDITIONAL STUDIES: 10

## 2021-06-02 NOTE — H&P PST ADULT - RS GEN PE MLT RESP DETAILS PC
respirations non-labored/no rales/no rhonchi/wheezes respirations non-labored/no rales/no rhonchi/diminished breath sounds, L/diminished breath sounds, R

## 2021-06-02 NOTE — H&P PST ADULT - ASSESSMENT
79 yo M 79 yo M PMH of HTN, HLD, BPH, GERD, morbid obesity (BMI 50.1), GINETTE (not on CPAP), presents with c/o abnormal non-contrast CT chest from [facility] done on 21 that demonstrated an elevation of the right hemidiaphragm with associated compressive atelectasis seen in the right middle lobe and at the right lung base. Patient reports recently increased shortness of breath with exertion. He denies fevers, chills, cough, chest pain, palpitations, dizziness. Denies Hx of smoking. Accompanied today by wife. Ambulates with cane or walker. Preop assessment prior to Flexible Bronchoscopy w/Dr Miller on       OPIOID RISK TOOL    LAYLA EACH BOX THAT APPLIES AND ADD TOTALS AT THE END    FAMILY HISTORY OF SUBSTANCE ABUSE                 FEMALE         MALE                                                Alcohol                             [  ]1 pt          [  ]3pts                                               Illegal Durgs                     [  ]2 pts        [  ]3pts                                               Rx Drugs                           [  ]4 pts        [  ]4 pts    PERSONAL HISTORY OF SUBSTANCE ABUSE                                                                                          Alcohol                             [  ]3 pts       [  ]3 pts                                               Illegal Drugs                     [  ]4 pts        [  ]4 pts                                               Rx Drugs                           [  ]5 pts        [  ]5 pts    AGE BETWEEN 16-45 YEARS                                      [  ]1 pt         [  ]1 pt    HISTORY OF PREADOLESCENT   SEXUAL ABUSE                                                             [  ]3 pts        [  ]0pts    PSYCHOLOGICAL DISEASE                     ADD, OCD, Bipolar, Schizophrenia        [  ]2 pts         [  ]2 pts                      Depression                                               [x  ]1 pt           [  ]1 pt           SCORING TOTAL   (add numbers and type here)              ( 1 )                                     A score of 3 or lower indicated LOW risk for future opioid abuse  A score of 4 to 7 indicated moderate risk for future opioid abuse  A score of 8 or higher indicates a high risk for opioid abuse    CAPRINI VTE 2.0 SCORE [CLOT updated 2019]    AGE RELATED RISK FACTORS                                                       MOBILITY RELATED FACTORS  [ ] Age 41-60 years                                            (1 Point)                    [ ] Bed rest                                                        (1 Point)  [ ] Age: 61-74 years                                           (2 Points)                  [ ] Plaster cast                                                   (2 Points)  [x ] Age= 75 years                                              (3 Points)                    [ ] Bed bound for more than 72 hours                 (2 Points)    DISEASE RELATED RISK FACTORS                                               GENDER SPECIFIC FACTORS  [ ] Edema in the lower extremities                       (1 Point)              [ ] Pregnancy                                                     (1 Point)  [ ] Varicose veins                                               (1 Point)                     [ ] Post-partum < 6 weeks                                   (1 Point)             [ x] BMI > 25 Kg/m2                                            (1 Point)                     [ ] Hormonal therapy  or oral contraception          (1 Point)                 [ ] Sepsis (in the previous month)                        (1 Point)               [ ] History of pregnancy complications                 (1 point)  [ ] Pneumonia or serious lung disease                                               [ ] Unexplained or recurrent                     (1 Point)           (in the previous month)                               (1 Point)  [ ] Abnormal pulmonary function test                     (1 Point)                 SURGERY RELATED RISK FACTORS  [ ] Acute myocardial infarction                              (1 Point)               [ ]  Section                                             (1 Point)  [ ] Congestive heart failure (in the previous month)  (1 Point)      [x ] Minor surgery                                                  (1 Point)   [ ] Inflammatory bowel disease                             (1 Point)               [ ] Arthroscopic surgery                                        (2 Points)  [ ] Central venous access                                      (2 Points)                [ ] General surgery lasting more than 45 minutes (2 points)  [ ] Malignancy- Present or previous                   (2 Points)                [ ] Elective arthroplasty                                         (5 points)    [ ] Stroke (in the previous month)                          (5 Points)                                                                                                                                                           HEMATOLOGY RELATED FACTORS                                                 TRAUMA RELATED RISK FACTORS  [ ] Prior episodes of VTE                                     (3 Points)                [ ] Fracture of the hip, pelvis, or leg                       (5 Points)  [ ] Positive family history for VTE                         (3 Points)             [ ] Acute spinal cord injury (in the previous month)  (5 Points)  [ ] Prothrombin 76219 A                                     (3 Points)               [ ] Paralysis  (less than 1 month)                             (5 Points)  [ ] Factor V Leiden                                             (3 Points)                  [ ] Multiple Trauma within 1 month                        (5 Points)  [ ] Lupus anticoagulants                                     (3 Points)                                                           [ ] Anticardiolipin antibodies                               (3 Points)                                                       [ ] High homocysteine in the blood                      (3 Points)                                             [ ] Other congenital or acquired thrombophilia      (3 Points)                                                [ ] Heparin induced thrombocytopenia                  (3 Points)                                     Total Score [    5      ]

## 2021-06-02 NOTE — H&P PST ADULT - NSICDXPASTMEDICALHX_GEN_ALL_CORE_FT
PAST MEDICAL HISTORY:  Gout     Hyperlipidemia     Hypertension     Solitary pulmonary nodule      PAST MEDICAL HISTORY:  Gout     Hyperlipidemia     Hypertension     Morbid obesity     GINETTE (obstructive sleep apnea) does not use CPAP    Solitary pulmonary nodule

## 2021-06-02 NOTE — H&P PST ADULT - NEUROLOGICAL DETAILS
alert and oriented x 3/responds to verbal commands/sensation intact/cranial nerves intact/strength decreased

## 2021-06-02 NOTE — H&P PST ADULT - HISTORY OF PRESENT ILLNESS
81 yo M PMH of HTN, HLD, morbid obesity (BMI 50.1), GINETTE (not on CPAP), presents with c/o abnormal 5/6/21 non-contrast CT chest from [facility] that demonstrated a elevation of the right hemidiaphragm with associated compressive atelectasis seen in the right middle lobe and at the right lung base. Preop assessment prior to Flexible Bronchoscopy w/Dr Miller on 6/9   81 yo M PMH of HTN, HLD, morbid obesity (BMI 50.1), GINETTE (not on CPAP), presents with c/o abnormal 21 non-contrast CT chest from [facility] that demonstrated a elevation of the right hemidiaphragm with associated compressive atelectasis seen in the right middle lobe and at the right lung base. Preop assessment prior to Flexible Bronchoscopy w/Dr Miller on     Imagin21 Non- Contrast CT Chest Scan from [facility]: Elevation of the right hemidiaphragm again seen with associated compressive atelectasis seen in the right middle lobe and at the right lung base. These findings appear unchanged compared with the prior study     2.. PFTs from [facility] at [place]: FEV1 81.1    1..21 Non- Contrast CT Chest Scan from [facility]   -Somewhat limited examination due to motion artifact   -Diffuse bronchial wall thickening as well inflammatory airway disease. Apparent mild narrowing of the right middle lobe bronchus, possibly related to shallow inspiratory effort and elevation of the right hemidiaphragm. Minimal soft tissue attenuation right lower lobe bronchus, probably related to secretions   -Subsegmental consolidation right middle lobe and minimally in the right lower lobe likely related to compressive atelectasis   -This is also in the inferior lingular lobe        81 yo M PMH of HTN, HLD, BPH, GERD, morbid obesity (BMI 50.1), GINETTE (not on CPAP), presents with c/o abnormal non-contrast CT chest [facility] done on 21 that demonstrated an elevation of the right hemidiaphragm with associated compressive atelectasis seen in the right middle lobe and at the right lung base. Patient reports recently increased shortness of breath with exertion. He denies fevers, chills, cough, chest pain, palpitations, dizziness. Denies Hx of smoking. Accompanied today by wife. Ambulates with cane or walker. Preop assessment prior to Flexible Bronchoscopy w/Dr Miller on     Imagin21 Non-contrast CT Chest Scan: Elevation of the right hemidiaphragm again seen with associated compressive atelectasis seen in the right middle lobe and at the right lung base. These findings appear unchanged compared with the prior study     21  PFTs from [facility] at [place]: FEV1 81.1    21 Non-contrast CT Chest Scan: Somewhat limited examination due to motion artifact. Diffuse bronchial wall thickening as well inflammatory airway disease. Apparent mild narrowing of the right middle lobe bronchus, possibly related to shallow inspiratory effort and elevation of the right hemidiaphragm. Minimal soft tissue attenuation right lower lobe bronchus, probably related to secretions. Subsegmental consolidation right middle lobe and minimally in the right lower lobe likely related to compressive atelectasis. This is also in the inferior lingular lobe

## 2021-06-02 NOTE — H&P PST ADULT - NSICDXPASTSURGICALHX_GEN_ALL_CORE_FT
PAST SURGICAL HISTORY:  History of knee replacement      PAST SURGICAL HISTORY:  History of appendectomy     History of cataract surgery bilateral    History of knee replacement bilateral

## 2021-06-07 PROBLEM — E66.01 MORBID (SEVERE) OBESITY DUE TO EXCESS CALORIES: Chronic | Status: ACTIVE | Noted: 2021-06-02

## 2021-06-07 PROBLEM — E78.5 HYPERLIPIDEMIA, UNSPECIFIED: Chronic | Status: ACTIVE | Noted: 2021-06-02

## 2021-06-07 PROBLEM — R91.1 SOLITARY PULMONARY NODULE: Chronic | Status: ACTIVE | Noted: 2021-06-02

## 2021-06-07 PROBLEM — I10 ESSENTIAL (PRIMARY) HYPERTENSION: Chronic | Status: ACTIVE | Noted: 2021-06-02

## 2021-06-07 PROBLEM — G47.33 OBSTRUCTIVE SLEEP APNEA (ADULT) (PEDIATRIC): Chronic | Status: ACTIVE | Noted: 2021-06-02

## 2021-06-07 PROBLEM — M10.9 GOUT, UNSPECIFIED: Chronic | Status: ACTIVE | Noted: 2021-06-02

## 2021-06-08 ENCOUNTER — TRANSCRIPTION ENCOUNTER (OUTPATIENT)
Age: 81
End: 2021-06-08

## 2021-06-08 ENCOUNTER — NON-APPOINTMENT (OUTPATIENT)
Age: 81
End: 2021-06-08

## 2021-06-08 ENCOUNTER — APPOINTMENT (OUTPATIENT)
Dept: CARDIOLOGY | Facility: CLINIC | Age: 81
End: 2021-06-08
Payer: MEDICARE

## 2021-06-08 VITALS
RESPIRATION RATE: 16 BRPM | DIASTOLIC BLOOD PRESSURE: 50 MMHG | HEIGHT: 62 IN | WEIGHT: 225 LBS | TEMPERATURE: 97.1 F | BODY MASS INDEX: 41.41 KG/M2 | HEART RATE: 79 BPM | SYSTOLIC BLOOD PRESSURE: 89 MMHG

## 2021-06-08 DIAGNOSIS — Z01.818 ENCOUNTER FOR OTHER PREPROCEDURAL EXAMINATION: ICD-10-CM

## 2021-06-08 PROCEDURE — 99214 OFFICE O/P EST MOD 30 MIN: CPT

## 2021-06-08 PROCEDURE — 93000 ELECTROCARDIOGRAM COMPLETE: CPT

## 2021-06-09 ENCOUNTER — OUTPATIENT (OUTPATIENT)
Dept: OUTPATIENT SERVICES | Facility: HOSPITAL | Age: 81
LOS: 1 days | End: 2021-06-09
Payer: MEDICARE

## 2021-06-09 ENCOUNTER — APPOINTMENT (OUTPATIENT)
Dept: THORACIC SURGERY | Facility: HOSPITAL | Age: 81
End: 2021-06-09

## 2021-06-09 ENCOUNTER — RESULT REVIEW (OUTPATIENT)
Age: 81
End: 2021-06-09

## 2021-06-09 DIAGNOSIS — Z98.49 CATARACT EXTRACTION STATUS, UNSPECIFIED EYE: Chronic | ICD-10-CM

## 2021-06-09 DIAGNOSIS — Z96.659 PRESENCE OF UNSPECIFIED ARTIFICIAL KNEE JOINT: Chronic | ICD-10-CM

## 2021-06-09 DIAGNOSIS — R91.1 SOLITARY PULMONARY NODULE: ICD-10-CM

## 2021-06-09 DIAGNOSIS — Z90.49 ACQUIRED ABSENCE OF OTHER SPECIFIED PARTS OF DIGESTIVE TRACT: Chronic | ICD-10-CM

## 2021-06-09 LAB
GRAM STN FLD: SIGNIFICANT CHANGE UP
SPECIMEN SOURCE: SIGNIFICANT CHANGE UP

## 2021-06-09 PROCEDURE — 31623 DX BRONCHOSCOPE/BRUSH: CPT | Mod: RT

## 2021-06-09 PROCEDURE — 88112 CYTOPATH CELL ENHANCE TECH: CPT

## 2021-06-09 PROCEDURE — 31624 DX BRONCHOSCOPE/LAVAGE: CPT

## 2021-06-09 PROCEDURE — 87070 CULTURE OTHR SPECIMN AEROBIC: CPT

## 2021-06-09 PROCEDURE — 31623 DX BRONCHOSCOPE/BRUSH: CPT

## 2021-06-09 PROCEDURE — 88112 CYTOPATH CELL ENHANCE TECH: CPT | Mod: 26

## 2021-06-09 PROCEDURE — 31624 DX BRONCHOSCOPE/LAVAGE: CPT | Mod: RT

## 2021-06-09 NOTE — ASSESSMENT
[FreeTextEntry1] : EKG 6/8/2021:  The EKG illustrates sinus rhythm, rate of 79 bpm, early R wave transition V1 to V2, nonspecific T wave abnormality.  Essentially unchanged.\par \par In summary this 80-year-old gentleman has a history of some mild dementia and obesity with exertional dyspnea which could be multifactorial in origin including his chronic arthritides and lower spinal pain which makes it difficult for this patient to ambulate bringing on more shortness of breath;

## 2021-06-09 NOTE — BRIEF OPERATIVE NOTE - OPERATION/FINDINGS
Flex Bronchoscopy  -BAL and Bronchial brushing  -Benign cartilaginous growths noted in Rt mainstem bronchus.

## 2021-06-09 NOTE — PHYSICAL EXAM
[No Acute Distress] : no acute distress [Obese] : obese [Normal Conjunctiva] : normal conjunctiva [Normal Venous Pressure] : normal venous pressure [No Carotid Bruit] : no carotid bruit [Normal S1, S2] : normal S1, S2 [No Rub] : no rub [No Gallop] : no gallop [Murmur] : murmur [Clear Lung Fields] : clear lung fields [No Respiratory Distress] : no respiratory distress  [Soft] : abdomen soft [Non Tender] : non-tender [No Masses/organomegaly] : no masses/organomegaly [Normal Gait] : normal gait [No Cyanosis] : no cyanosis [No Clubbing] : no clubbing [No Rash] : no rash [No Skin Lesions] : no skin lesions [Moves all extremities] : moves all extremities [No Focal Deficits] : no focal deficits [Normal Speech] : normal speech [Alert and Oriented] : alert and oriented [Normal memory] : normal memory [de-identified] : Grade I/VI systolic murmur [de-identified] : decreased breath sounds bilaterally at the bases [de-identified] : 1-2+ LLE pretibial edema, none on right.

## 2021-06-09 NOTE — BRIEF OPERATIVE NOTE - COMMENTS
Invasive Lines: NONE  IV Medication Infusions: NONE  No qualified resident was available to assist in this case. I have personally first assisted the Cardiothoracic Surgeon listed in this brief op note throughout the entirety of this case.   Extubated in OR

## 2021-06-09 NOTE — DISCUSSION/SUMMARY
[FreeTextEntry1] : 1).  Patient's blood pressure suboptimally low today although he remains asymptomatic.  Will empirically decrease Torsemide to QOD dosing in hopes of maintaining euvolemia and stabilizing pressures.\par \par 2).  Based upon Mr. Marbin Tapia's otherwise stable cardiac pattern, there is no absolute cardiac contraindication for him to undergo the proposed flexible bronchoscopy procedure.\par \par He may hold the aspirin five to seven days prior to procedure and restart thereafter if you deem it safe.\par \par 3).  Patient will follow up with Dr. Sanchez on August 10th or PRN.\par \par If I may be of additional assistance, please do not hesitate to call.

## 2021-06-09 NOTE — REASON FOR VISIT
[FreeTextEntry1] : LILIANA TAPIA is being seen for a clinic follow-up of. \par \par The patient is an 80-year-old white male with a known history for mild dementia and underlying obstructive sleep apnea (no CPAP), obesity with pulmonary hypertension, and chronic complaints of exertional dyspnea.\par \par Mr. Tapia is here today in need of cardiac clearance for scheduled flexible bronchoscopy with Dr. Miller tomorrow (June 9th) at [facility].\par \par He denies CP, orthopnea, PND, palpitations, presyncope, syncope.

## 2021-06-09 NOTE — HISTORY OF PRESENT ILLNESS
[FreeTextEntry1] : Dr. Miller is performing flexible bronchoscopy for having recent CT scan showing elevated hemidiaphragm and atelectasis;\par \par Tolerating current medical regimen without difficulty including ASA 81 mg QD, Torsemide 20 mg QD and Lisinopril 10 mg QD;\par \par His blood pressures found to be unusually low today at (89/50) although he remains asymptomatic without c/o lightheadedness, dizziness, CP;\par \par Most recent echo from 2/24/21 with preserved systolic function of the left ventricle with mild LVH and EF range of 55-60%; no evidence of diastolic dysfunction; moderate aortic sclerosis without stenosis. Trace MR and TR. No pericardial effusion;\par \par Last nuclear stress test in 2017 was negative for ischemia on the myocardial perfusion images;

## 2021-06-09 NOTE — BRIEF OPERATIVE NOTE - NSICDXBRIEFPROCEDURE_GEN_ALL_CORE_FT
PROCEDURES:  Flexible bronchoscopy 09-Jun-2021 09:29:01  Godfrey Duggan  Bronchoscopy, with BAL 09-Jun-2021 09:29:10  Godfrey Duggan  Bronchial brushing cytology 09-Jun-2021 09:30:04  Godfrey Duggan

## 2021-06-11 LAB
CULTURE RESULTS: NO GROWTH — SIGNIFICANT CHANGE UP
SPECIMEN SOURCE: SIGNIFICANT CHANGE UP

## 2021-06-16 LAB — NON-GYNECOLOGICAL CYTOLOGY STUDY: SIGNIFICANT CHANGE UP

## 2021-06-22 ENCOUNTER — APPOINTMENT (OUTPATIENT)
Dept: PULMONOLOGY | Facility: CLINIC | Age: 81
End: 2021-06-22

## 2021-08-10 ENCOUNTER — NON-APPOINTMENT (OUTPATIENT)
Age: 81
End: 2021-08-10

## 2021-08-10 ENCOUNTER — APPOINTMENT (OUTPATIENT)
Dept: CARDIOLOGY | Facility: CLINIC | Age: 81
End: 2021-08-10
Payer: MEDICARE

## 2021-08-10 VITALS
DIASTOLIC BLOOD PRESSURE: 82 MMHG | RESPIRATION RATE: 16 BRPM | BODY MASS INDEX: 41.04 KG/M2 | HEART RATE: 84 BPM | HEIGHT: 62 IN | WEIGHT: 223 LBS | SYSTOLIC BLOOD PRESSURE: 128 MMHG

## 2021-08-10 DIAGNOSIS — Z86.79 PERSONAL HISTORY OF OTHER DISEASES OF THE CIRCULATORY SYSTEM: ICD-10-CM

## 2021-08-10 PROCEDURE — 93000 ELECTROCARDIOGRAM COMPLETE: CPT

## 2021-08-10 PROCEDURE — 99214 OFFICE O/P EST MOD 30 MIN: CPT

## 2021-08-10 NOTE — HISTORY OF PRESENT ILLNESS
[FreeTextEntry1] : There is been no falling episodes or syncope. He denies any significant chest pain, orthopnea or PND;\par \par Transthoracic echo from 2/24/21 showed mild LVH with preserved LVEF 55-60%. Mild diastolic dysfunction. Eyes are aortic sclerosis with trace MR and TR;\par \par patient underwent flex bronchoscopy with Dr. Miller in June because of elevated hemidiaphragm and atelectasis;\par Apparently "no malignant cells were found";\par \par

## 2021-08-10 NOTE — ASSESSMENT
[FreeTextEntry1] : EKG shows normal sinus rhythm at a rate of 84;  RSR prime V1 and V2. Some baseline artifact but no acute changes; general low-voltage;\par \par In summary this 81-year-old gentleman has a history for early dementia, underlying GINETTE and obesity with diffuse arthritides mostly wheelchair ridden but ambulates a little with cane and demonstrates some persistent ankle edema. Etiology could be multifactorial but doubt any significant evidence for CHF; Probable venous insufficiency of the lower extremities with salt loading and inactivity contributing factors as well;\par \par Plan:\par \par \par Patient recommended to continue to try to pursue a low sodium, low carbohydrate reducing diet\par Recommend elevating feet on couple of pillows or chair when seated for long periods of time;\par \par Will give Rx for elastic compression stockings to support;\par \par Recommend increase torsemide for once or twice a week to 40 mg only; then continue 20 mg daily;\par \par Continue other medications the same\par \par Followup to office within 3 months or p.r.n.\par \par Periodic checkups and laboratory blood tests with primary care and encouraged;;;\par \par ;\par

## 2021-08-10 NOTE — REASON FOR VISIT
[Symptom and Test Evaluation] : symptom and test evaluation [Arrhythmia/ECG Abnorrmalities] : arrhythmia/ECG abnormalities [Structural Heart and Valve Disease] : structural heart and valve disease [Hypertension] : hypertension [Other: ____] : [unfilled] [Spouse] : spouse [FreeTextEntry3] : EDMOND YA [FreeTextEntry1] : The patient is an 81-year-old white male with a history for underlying GINETTE (no CPAP and use) obesity, pulmonary hypertension and some chronic complaints of exertional dyspnea. He also has associated early dementia;\par \par Patient presents back to the office for general cardiac checkup today;\par \par He is accompanied with his wife;\par \par They report that he has had some persistent edema of the lower extremities and feet;\par And a variety of other somatic complaints such as muscular aches and pains from the lower back and knees;\par \par He is ambulating very little with a cane and mostly sits in a wheelchair;

## 2021-08-10 NOTE — PHYSICAL EXAM
[Well Developed] : well developed [Well Nourished] : well nourished [No Acute Distress] : no acute distress [Obese] : obese [Normal Conjunctiva] : normal conjunctiva [Normal Venous Pressure] : normal venous pressure [No Carotid Bruit] : no carotid bruit [Normal S1, S2] : normal S1, S2 [No Rub] : no rub [No Gallop] : no gallop [Clear Lung Fields] : clear lung fields [No Respiratory Distress] : no respiratory distress  [Soft] : abdomen soft [Non Tender] : non-tender [No Masses/organomegaly] : no masses/organomegaly [Normal Bowel Sounds] : normal bowel sounds [No Cyanosis] : no cyanosis [No Clubbing] : no clubbing [No Rash] : no rash [No Skin Lesions] : no skin lesions [Moves all extremities] : moves all extremities [No Focal Deficits] : no focal deficits [Normal Speech] : normal speech [Cognitive Impairment] : cognitive impairment [de-identified] : Regular rhythm, grade 1-2/6 systolic murmur; [de-identified] : slightly diminished breath sounds at the bases [de-identified] : obese [de-identified] : abnormal gait and walks with cane and using wheelchair as well [de-identified] : 1+ ankle and pretibial edema left greater than right

## 2021-08-10 NOTE — REVIEW OF SYSTEMS
[Feeling Fatigued] : feeling fatigued [Dyspnea on exertion] : dyspnea during exertion [Lower Ext Edema] : lower extremity edema [Joint Pain] : joint pain [Joint Stiffness] : joint stiffness [Knee Problem] : knee problems [Knee Pain] : knee pain [Lower Back Pain] : lower back pain [Negative] : Heme/Lymph

## 2021-12-01 ENCOUNTER — NON-APPOINTMENT (OUTPATIENT)
Age: 81
End: 2021-12-01

## 2021-12-01 ENCOUNTER — APPOINTMENT (OUTPATIENT)
Dept: CARDIOLOGY | Facility: CLINIC | Age: 81
End: 2021-12-01
Payer: MEDICARE

## 2021-12-01 VITALS
WEIGHT: 223 LBS | HEART RATE: 93 BPM | HEIGHT: 62 IN | BODY MASS INDEX: 41.04 KG/M2 | RESPIRATION RATE: 16 BRPM | SYSTOLIC BLOOD PRESSURE: 129 MMHG | DIASTOLIC BLOOD PRESSURE: 80 MMHG

## 2021-12-01 DIAGNOSIS — E78.5 HYPERLIPIDEMIA, UNSPECIFIED: ICD-10-CM

## 2021-12-01 DIAGNOSIS — R09.89 OTHER SPECIFIED SYMPTOMS AND SIGNS INVOLVING THE CIRCULATORY AND RESPIRATORY SYSTEMS: ICD-10-CM

## 2021-12-01 PROCEDURE — 93000 ELECTROCARDIOGRAM COMPLETE: CPT

## 2021-12-01 PROCEDURE — 99214 OFFICE O/P EST MOD 30 MIN: CPT

## 2021-12-01 NOTE — ASSESSMENT
[FreeTextEntry1] : EKG shows normal sinus rhythm at a rate of 84; borderline nonspecific T wave changes. Low voltage leads;\par \par In summary this 81-year-old gentleman with history for pulmonary hypertension, underlying obstructive sleep apnea and possible COPD with some complaints of exertional dyspnea but very sedentary lifestyle at this time has had an otherwise stable cardiac pattern;\par \par Plan:\par \par ; No additional cardiac workup indicated at this time\par \par Patient encouraged toattempt to do some modest physical walking exercise and movement;\par \par ; Followup with primary care for timely checkups and laboratory blood tests\par \par Return to office within 4-5 months or p.r.n.;

## 2021-12-01 NOTE — PHYSICAL EXAM
[Well Developed] : well developed [Well Nourished] : well nourished [No Acute Distress] : no acute distress [Normal Conjunctiva] : normal conjunctiva [Normal Venous Pressure] : normal venous pressure [No Carotid Bruit] : no carotid bruit [Normal S1, S2] : normal S1, S2 [No Rub] : no rub [No Gallop] : no gallop [Clear Lung Fields] : clear lung fields [No Respiratory Distress] : no respiratory distress  [Soft] : abdomen soft [Non Tender] : non-tender [No Masses/organomegaly] : no masses/organomegaly [Normal Bowel Sounds] : normal bowel sounds [Abnormal Gait] : abnormal gait [No Edema] : no edema [No Cyanosis] : no cyanosis [No Clubbing] : no clubbing [No Varicosities] : no varicosities [No Rash] : no rash [No Skin Lesions] : no skin lesions [Moves all extremities] : moves all extremities [No Focal Deficits] : no focal deficits [Normal Speech] : normal speech [Cognitive Impairment] : cognitive impairment [de-identified] : regular rhythm, grade 1/6 systolic murmur; [de-identified] : mildly decreased breath sounds bilaterally but otherwise clear; [de-identified] : obese; [de-identified] : using cane/walker;

## 2021-12-01 NOTE — REVIEW OF SYSTEMS
[Feeling Fatigued] : feeling fatigued [Dyspnea on exertion] : dyspnea during exertion [Joint Pain] : joint pain [Joint Stiffness] : joint stiffness [Myalgia] : myalgia [Negative] : Heme/Lymph

## 2021-12-01 NOTE — HISTORY OF PRESENT ILLNESS
[FreeTextEntry1] : He denies chest pain, palpitations, dizziness or syncope;\par \par The patient has a history of obstructive sleep apnea but does not use CPAP;\par \par Last echo from February 2021 showed preserved left ventricular systolic function with EF 55-60%. Trace MR and TR and mild diastolic dysfunction;\par \par

## 2021-12-01 NOTE — REASON FOR VISIT
[Symptom and Test Evaluation] : symptom and test evaluation [Arrhythmia/ECG Abnorrmalities] : arrhythmia/ECG abnormalities [Spouse] : spouse [FreeTextEntry3] : EDMOND Roblero [FreeTextEntry1] : The patient is an 81-year-old white male mostly Turkmen speaking who presents back to the office for general cardiac checkup;\par \par He's been followed for a history of exertional dyspnea, borderline abnormal EKG and known history for pulmonary hypertension;\par \par The patient is accompanied with his wife today, and she reports that he has been extremely sedentary and sits most of the day watching TV and sleeping;\par \par He gets some exertional dyspnea at times when doing any meaningful walking or exertion and complains of lower extremity fatigue and arthritic complaint;

## 2022-03-15 ENCOUNTER — APPOINTMENT (OUTPATIENT)
Dept: PULMONOLOGY | Facility: CLINIC | Age: 82
End: 2022-03-15
Payer: MEDICARE

## 2022-03-15 PROCEDURE — 99214 OFFICE O/P EST MOD 30 MIN: CPT | Mod: 95

## 2022-03-15 RX ORDER — SIMVASTATIN 20 MG/1
20 TABLET, FILM COATED ORAL
Qty: 90 | Refills: 1 | Status: ACTIVE | COMMUNITY
Start: 2021-01-18

## 2022-03-15 RX ORDER — LISINOPRIL 10 MG/1
10 TABLET ORAL DAILY
Qty: 30 | Refills: 3 | Status: ACTIVE | COMMUNITY
Start: 2021-01-18

## 2022-03-15 RX ORDER — TORSEMIDE 20 MG/1
20 TABLET ORAL DAILY
Qty: 90 | Refills: 1 | Status: ACTIVE | COMMUNITY
Start: 2021-01-18

## 2022-04-05 ENCOUNTER — APPOINTMENT (OUTPATIENT)
Dept: CARDIOLOGY | Facility: CLINIC | Age: 82
End: 2022-04-05

## 2022-04-06 ENCOUNTER — APPOINTMENT (OUTPATIENT)
Dept: PULMONOLOGY | Facility: CLINIC | Age: 82
End: 2022-04-06
Payer: MEDICARE

## 2022-04-06 VITALS
DIASTOLIC BLOOD PRESSURE: 80 MMHG | WEIGHT: 211 LBS | BODY MASS INDEX: 38.83 KG/M2 | HEART RATE: 61 BPM | OXYGEN SATURATION: 95 % | RESPIRATION RATE: 16 BRPM | HEIGHT: 62 IN | SYSTOLIC BLOOD PRESSURE: 122 MMHG

## 2022-04-06 DIAGNOSIS — R06.02 SHORTNESS OF BREATH: ICD-10-CM

## 2022-04-06 DIAGNOSIS — F03.90 UNSPECIFIED DEMENTIA W/OUT BEHAVIORAL DISTURBANCE: ICD-10-CM

## 2022-04-06 DIAGNOSIS — R93.89 ABNORMAL FINDINGS ON DIAGNOSTIC IMAGING OF OTHER SPECIFIED BODY STRUCTURES: ICD-10-CM

## 2022-04-06 DIAGNOSIS — J96.91 RESPIRATORY FAILURE, UNSPECIFIED WITH HYPOXIA: ICD-10-CM

## 2022-04-06 PROCEDURE — 99214 OFFICE O/P EST MOD 30 MIN: CPT

## 2022-04-20 ENCOUNTER — NON-APPOINTMENT (OUTPATIENT)
Age: 82
End: 2022-04-20

## 2022-04-20 ENCOUNTER — APPOINTMENT (OUTPATIENT)
Dept: CARDIOLOGY | Facility: CLINIC | Age: 82
End: 2022-04-20
Payer: MEDICARE

## 2022-04-20 VITALS
BODY MASS INDEX: 39.75 KG/M2 | DIASTOLIC BLOOD PRESSURE: 78 MMHG | HEART RATE: 82 BPM | HEIGHT: 62 IN | OXYGEN SATURATION: 96 % | WEIGHT: 216 LBS | RESPIRATION RATE: 16 BRPM | SYSTOLIC BLOOD PRESSURE: 110 MMHG

## 2022-04-20 DIAGNOSIS — E66.9 OBESITY, UNSPECIFIED: ICD-10-CM

## 2022-04-20 PROCEDURE — 93000 ELECTROCARDIOGRAM COMPLETE: CPT

## 2022-04-20 PROCEDURE — 99214 OFFICE O/P EST MOD 30 MIN: CPT

## 2022-04-20 NOTE — REASON FOR VISIT
[FreeTextEntry1] : SHAISTA FELDER is being seen for symptom and test evaluation arrhythmia/ECG abnormalities. Patient accompanied by spouse. \par \par The patient is an 81-year-old white male mostly Yemeni speaking who presents back to the office for general cardiac checkup;\par \par He's been followed for a history of exertional dyspnea, borderline abnormal EKG and known history for pulmonary hypertension;\par \par The patient is accompanied with his wife today, and she reports that he has been extremely sedentary and sits most of the day watching TV and sleeping;\par \par He gets some exertional dyspnea at times when doing any meaningful walking or exertion and complains of lower extremity fatigue and arthritic complaint;

## 2022-04-20 NOTE — HISTORY OF PRESENT ILLNESS
[FreeTextEntry1] : He denies chest pain, palpitations, dizziness or syncope;\par \par The patient has a history of obstructive sleep apnea but does not use CPAP;\par \par Last echo from February 2021 showed preserved left ventricular systolic function with EF 55-60%. Trace MR and TR and mild diastolic dysfunction;

## 2022-04-20 NOTE — ASSESSMENT
[FreeTextEntry1] : EKG shows normal sinus rhythm at a rate of 82; borderline nonspecific T wave changes. Low voltage leads;\par \par In summary this 81-year-old gentleman with history for pulmonary hypertension, underlying obstructive sleep apnea and possible COPD with some complaints of exertional dyspnea but very sedentary lifestyle;

## 2022-04-20 NOTE — DISCUSSION/SUMMARY
[FreeTextEntry1] : 1).  Patient to complete a Transthoracic Echocardiogram and Carotid Doppler study prior to follow up visit to assess overall cardiac function and valvulopathy as well as to assess extent of carotid plaquing stenosis respectively.\par \par 2).  Continue current cardiac meds the same.\par \par 3).  Diet and lifestyle modification discussed including low sodium, low fat and low carbohydrate weight reducing diet.  Keep well PO hydrated .\par \par 4).  Follow up with PCP (Dr. Ruffin) regarding routine checkups and blood work.  Forward all testing/lab work to our office. \par \par 5).  Recommend patient report any untoward symptoms. \par \par 6).  Follow up with Dr. Sanchez in 4-5 months or PRN.

## 2022-04-20 NOTE — PHYSICAL EXAM
[No Acute Distress] : no acute distress [Obese] : obese [Normal Conjunctiva] : normal conjunctiva [Normal Venous Pressure] : normal venous pressure [Carotid Bruit] : carotid bruit [Normal S1, S2] : normal S1, S2 [No Rub] : no rub [No Gallop] : no gallop [Murmur] : murmur [Clear Lung Fields] : clear lung fields [No Respiratory Distress] : no respiratory distress  [Soft] : abdomen soft [Non Tender] : non-tender [No Masses/organomegaly] : no masses/organomegaly [Normal Gait] : normal gait [No Cyanosis] : no cyanosis [No Clubbing] : no clubbing [No Rash] : no rash [No Skin Lesions] : no skin lesions [Moves all extremities] : moves all extremities [No Focal Deficits] : no focal deficits [Normal Speech] : normal speech [Alert and Oriented] : alert and oriented [Normal memory] : normal memory [de-identified] : right [de-identified] : Grade I/VI systolic murmur [de-identified] : decreased breath sounds bilaterally at the bases [de-identified] : 1-2+ LLE pretibial edema, none on right.

## 2022-09-06 ENCOUNTER — APPOINTMENT (OUTPATIENT)
Dept: CARDIOLOGY | Facility: CLINIC | Age: 82
End: 2022-09-06

## 2022-09-06 PROCEDURE — 93880 EXTRACRANIAL BILAT STUDY: CPT

## 2022-09-06 PROCEDURE — 93306 TTE W/DOPPLER COMPLETE: CPT

## 2022-09-06 RX ADMIN — PERFLUTREN MG/ML: 6.52 INJECTION, SUSPENSION INTRAVENOUS at 00:00

## 2022-09-08 RX ORDER — PERFLUTREN 6.52 MG/ML
6.52 INJECTION, SUSPENSION INTRAVENOUS
Qty: 2 | Refills: 0 | Status: COMPLETED | OUTPATIENT
Start: 2022-09-06

## 2022-09-13 ENCOUNTER — APPOINTMENT (OUTPATIENT)
Dept: CARDIOLOGY | Facility: CLINIC | Age: 82
End: 2022-09-13

## 2022-09-13 ENCOUNTER — NON-APPOINTMENT (OUTPATIENT)
Age: 82
End: 2022-09-13

## 2022-09-13 VITALS
BODY MASS INDEX: 39.2 KG/M2 | SYSTOLIC BLOOD PRESSURE: 104 MMHG | DIASTOLIC BLOOD PRESSURE: 62 MMHG | HEIGHT: 62 IN | WEIGHT: 213 LBS | RESPIRATION RATE: 16 BRPM | HEART RATE: 74 BPM

## 2022-09-13 PROCEDURE — 99214 OFFICE O/P EST MOD 30 MIN: CPT

## 2022-09-13 PROCEDURE — 93000 ELECTROCARDIOGRAM COMPLETE: CPT

## 2022-09-13 RX ORDER — TERAZOSIN 5 MG/1
5 CAPSULE ORAL
Refills: 0 | Status: DISCONTINUED | COMMUNITY
Start: 2016-10-22 | End: 2022-09-13

## 2022-09-13 RX ORDER — TAMSULOSIN HYDROCHLORIDE 0.4 MG/1
0.4 CAPSULE ORAL
Qty: 90 | Refills: 3 | Status: DISCONTINUED | COMMUNITY
Start: 2017-04-14 | End: 2022-09-13

## 2022-09-13 NOTE — PHYSICAL EXAM
[No Acute Distress] : no acute distress [Obese] : obese [Normal Conjunctiva] : normal conjunctiva [Normal Venous Pressure] : normal venous pressure [Carotid Bruit] : carotid bruit [Normal S1, S2] : normal S1, S2 [No Rub] : no rub [No Gallop] : no gallop [Murmur] : murmur [Clear Lung Fields] : clear lung fields [No Respiratory Distress] : no respiratory distress  [Soft] : abdomen soft [Non Tender] : non-tender [No Masses/organomegaly] : no masses/organomegaly [Normal Gait] : normal gait [No Cyanosis] : no cyanosis [No Clubbing] : no clubbing [No Rash] : no rash [No Skin Lesions] : no skin lesions [Moves all extremities] : moves all extremities [No Focal Deficits] : no focal deficits [Normal Speech] : normal speech [de-identified] : right [de-identified] : Grade I/VI systolic murmur [de-identified] : Slightly diminished breath sounds bilaterally but otherwise clear; [de-identified] : Trace ankle edema; [de-identified] : Speaks mostly Lithuanian.  But early dementia noted

## 2022-09-13 NOTE — DISCUSSION/SUMMARY
[FreeTextEntry1] : 1).  Patient and patient's wife reassured with stable cardiac pattern;\par No additional cardiac work-up indicated at this time;\par \par 2).  In view of relatively low normal blood pressure and no significant edema, recommend decrease torsemide to 20 mg only 3 times per week;\par \par 3).  Diet and lifestyle modification discussed including low sodium, low fat and low carbohydrate weight reducing diet.  Keep well PO hydrated .\par \par 4).  Follow up with PCP (Dr. Ruffin) regarding routine checkups and blood work.  Forward all testing/lab work to our office. \par \par 5).  Recommend patient report any untoward symptoms. \par \par 6).  Follow up with within 4 to 5 months or as needed;

## 2022-09-13 NOTE — HISTORY OF PRESENT ILLNESS
[FreeTextEntry1] : He denies chest pain, palpitations, dizziness or syncope;\par \par The patient has a history of obstructive sleep apnea but does not use CPAP;\par \par Carotid duplex study from September 6, 2022 demonstrates bilateral mild calcified and heterogeneous plaquing without any significant obstructive disease.;\par \par Transthoracic echo from 9/6/2022 shows preserved cardiac chamber sizes with normal systolic function of the left ventricle and EF of 60 to 65%.  There is mild diastolic function.  There is some enlargement of the left atrium noted.  There is calcification of the aortic valve but it otherwise opens normally with trace MR and PI;

## 2022-09-13 NOTE — ASSESSMENT
[FreeTextEntry1] : EKG shows normal sinus rhythm at a rate of 74; borderline nonspecific T wave changes. Low voltage leads;\par \par In summary this 82-year-old gentleman with history for pulmonary hypertension, underlying obstructive sleep apnea and possible COPD with some complaints of exertional dyspnea but very sedentary lifestyle;\par Otherwise, appears hemodynamically stable;\par Exertional dyspnea could be multifactorial and contributing factors are obesity and cardiovascular deconditioning as well as some arthritic complaints of the lower extremities and lower spine.  Patient ambulates slowly with a walker;\par Otherwise hemodynamically stable; blood pressure well controlled in the low normal range;

## 2022-09-13 NOTE — REASON FOR VISIT
[FreeTextEntry3] : FRANSISCA SANDOVAL [FreeTextEntry1] : SHAISTA FELDER is being seen for symptom and test evaluation arrhythmia/ECG abnormalities. Patient accompanied by spouse. \par \par The patient is an 82-year-old white male mostly [place] speaking who presents back to the office for general cardiac checkup and to discuss results of recent cardiovascular testing;\par \par He's been followed for a history of early dementia, exertional dyspnea, borderline abnormal EKG and known history for pulmonary hypertension;\par \par The patient is accompanied with his wife today, and she reports that he has been extremely sedentary and sits most of the day watching TV and sleeping;\par \par He gets some exertional dyspnea at times when doing any meaningful walking or exertion and complains of lower extremity fatigue and arthritic complaint;

## 2023-02-14 ENCOUNTER — NON-APPOINTMENT (OUTPATIENT)
Age: 83
End: 2023-02-14

## 2023-02-14 ENCOUNTER — APPOINTMENT (OUTPATIENT)
Dept: CARDIOLOGY | Facility: CLINIC | Age: 83
End: 2023-02-14
Payer: MEDICARE

## 2023-02-14 VITALS — OXYGEN SATURATION: 96 %

## 2023-02-14 VITALS
HEART RATE: 120 BPM | SYSTOLIC BLOOD PRESSURE: 128 MMHG | DIASTOLIC BLOOD PRESSURE: 80 MMHG | HEIGHT: 62 IN | BODY MASS INDEX: 41.41 KG/M2 | RESPIRATION RATE: 12 BRPM | WEIGHT: 225 LBS

## 2023-02-14 VITALS — HEART RATE: 97 BPM

## 2023-02-14 DIAGNOSIS — J45.909 UNSPECIFIED ASTHMA, UNCOMPLICATED: ICD-10-CM

## 2023-02-14 DIAGNOSIS — G47.33 OBSTRUCTIVE SLEEP APNEA (ADULT) (PEDIATRIC): ICD-10-CM

## 2023-02-14 DIAGNOSIS — R01.1 CARDIAC MURMUR, UNSPECIFIED: ICD-10-CM

## 2023-02-14 DIAGNOSIS — I27.20 PULMONARY HYPERTENSION, UNSPECIFIED: ICD-10-CM

## 2023-02-14 DIAGNOSIS — R06.2 WHEEZING: ICD-10-CM

## 2023-02-14 PROCEDURE — 99214 OFFICE O/P EST MOD 30 MIN: CPT

## 2023-02-14 PROCEDURE — 93000 ELECTROCARDIOGRAM COMPLETE: CPT

## 2023-02-15 NOTE — HISTORY OF PRESENT ILLNESS
[FreeTextEntry1] : Carotid duplex study from September 6, 2022 demonstrates bilateral mild calcified and heterogeneous plaquing without any significant obstructive disease.;\par \par Transthoracic echo from 9/6/2022 shows preserved cardiac chamber sizes with normal systolic function of the left ventricle and EF of 60 to 65%. There is mild diastolic function. There is some enlargement of the left atrium noted. There is calcification of the aortic valve but it otherwise opens normally with trace MR and PI;

## 2023-02-15 NOTE — PHYSICAL EXAM
[No Acute Distress] : no acute distress [Obese] : obese [Normal Conjunctiva] : normal conjunctiva [Normal Venous Pressure] : normal venous pressure [No Carotid Bruit] : no carotid bruit [Normal S1, S2] : normal S1, S2 [No Rub] : no rub [No Gallop] : no gallop [Murmur] : murmur [Clear Lung Fields] : clear lung fields [No Respiratory Distress] : no respiratory distress  [Soft] : abdomen soft [Non Tender] : non-tender [No Masses/organomegaly] : no masses/organomegaly [No Edema] : no edema [No Cyanosis] : no cyanosis [No Clubbing] : no clubbing [No Rash] : no rash [No Skin Lesions] : no skin lesions [Moves all extremities] : moves all extremities [No Focal Deficits] : no focal deficits [Normal Speech] : normal speech [Alert and Oriented] : alert and oriented [Normal memory] : normal memory [de-identified] : Grade I/VI systolic murmur [de-identified] : decreased breath sounds bilaterally at the bases, slight wheezing at apices. [de-identified] : ambulates slowly with walker

## 2023-02-15 NOTE — REASON FOR VISIT
[FreeTextEntry1] : SHAISTA FELDER is being seen for symptom and test evaluation arrhythmia/ECG abnormalities. Patient accompanied by spouse. \par \par The patient is an 82-year-old white male mostly Greek speaking who presents back to the office for general cardiac checkup;\par \par He's been followed for a history of early dementia, exertional dyspnea, borderline abnormal EKG and known history for pulmonary hypertension, GINETTE (could not tolerate CPAP), respiratory failure with hypoxia (follows Pulmonologist- Dr. Thrasher);\par \par The patient is accompanied with his wife and his daughter today, and they report that he has been extremely sedentary and sits most of the day watching TV and sleeping.  He does continue to get shortness of breath during exertion, but admits to not taking Budesonide Nebulizers BID as advised by his Pulmonologist;\par \par He denies chest pain, palpitations, dizziness or syncope.

## 2023-02-15 NOTE — ASSESSMENT
[FreeTextEntry1] : EKG shows normal sinus tachycardia at a rate of 120 (patient was slipping off table during EKG); borderline nonspecific T wave changes. Low voltage leads;  Essentially unchanged.\par \par In summary this 82-year-old gentleman with history for pulmonary hypertension, underlying obstructive sleep apnea and possible COPD with some complaints of exertional dyspnea but very sedentary lifestyle;\par Otherwise, appears hemodynamically stable;\par Exertional dyspnea could be multifactorial and contributing factors are obesity and cardiovascular deconditioning as well as some arthritic complaints of the lower extremities and lower spine. Patient ambulates slowly with a walker;\par Otherwise hemodynamically stable; blood pressure well controlled in the low normal range;\par \par Heart rates repeated after patient able to sit comfortably on exam table now running in the 90's;\par \par Apparently, patient's Pulmonologist (Dr. Thrasher) suggested he begin taking Budesonide Nebulizers BID for respiratory failure with hypoxia, but unfortunately he has not yet begun to do this;\par \par \par PLAN:\par \par \par 1). Patient and patient's wife reassured with stable cardiac pattern;\par Will have him repeat a Transthoracic Echocardiogram prior to follow up to assess overall cardiac function and valvulopathy.\par \par 2). Recommend he begin taking Budesonide Nebulizers BID as suggested by his Pulmonologist in hopes of improving his overall breathing and pulse Ox on room air.\par \par 3). Diet and lifestyle modification discussed including low sodium, low fat and low carbohydrate weight reducing diet. Keep well PO hydrated.\par \par 4). Follow up with PCP (Dr. Ruffin) regarding routine checkups and blood work. Forward all testing/lab work to our office. \par \par 5). Recommend patient report any untoward symptoms. \par \par 6). Follow up with Dr. Sanchez within 4 to 5 months or as needed;.\par

## 2023-05-05 ENCOUNTER — OFFICE (OUTPATIENT)
Dept: URBAN - METROPOLITAN AREA CLINIC 63 | Facility: CLINIC | Age: 83
Setting detail: OPHTHALMOLOGY
End: 2023-05-05
Payer: MEDICARE

## 2023-05-05 DIAGNOSIS — H35.372: ICD-10-CM

## 2023-05-05 DIAGNOSIS — H35.033: ICD-10-CM

## 2023-05-05 PROCEDURE — 92134 CPTRZ OPH DX IMG PST SGM RTA: CPT | Performed by: SPECIALIST

## 2023-05-05 PROCEDURE — 92004 COMPRE OPH EXAM NEW PT 1/>: CPT | Performed by: SPECIALIST

## 2023-05-05 ASSESSMENT — SPHEQUIV_DERIVED
OS_SPHEQUIV: -0.625
OD_SPHEQUIV: 0.875

## 2023-05-05 ASSESSMENT — AXIALLENGTH_DERIVED
OS_AL: 23.9321
OD_AL: 23.3454

## 2023-05-05 ASSESSMENT — LID EXAM ASSESSMENTS
OD_BLEPHARITIS: 1+
OS_BLEPHARITIS: 2+

## 2023-05-05 ASSESSMENT — REFRACTION_AUTOREFRACTION
OS_SPHERE: -0.25
OD_AXIS: 107
OD_SPHERE: +1.75
OD_CYLINDER: -1.75
OS_AXIS: 043
OS_CYLINDER: -0.75

## 2023-05-05 ASSESSMENT — KERATOMETRY
METHOD_AUTO_MANUAL: AUTO
OD_K2POWER_DIOPTERS: 43.75
OS_AXISANGLE_DEGREES: 127
OS_K1POWER_DIOPTERS: 43.00
OD_AXISANGLE_DEGREES: 029
OS_K2POWER_DIOPTERS: 43.50
OD_K1POWER_DIOPTERS: 42.75

## 2023-05-05 ASSESSMENT — LID POSITION - PTOSIS
OS_PTOSIS: T
OD_PTOSIS: T

## 2023-05-05 ASSESSMENT — TONOMETRY: OD_IOP_MMHG: 12

## 2023-05-05 ASSESSMENT — VISUAL ACUITY
OD_BCVA: 20/30+
OS_BCVA: 20/30

## 2023-05-05 ASSESSMENT — CONFRONTATIONAL VISUAL FIELD TEST (CVF)
OD_FINDINGS: FULL
OS_FINDINGS: FULL

## 2023-05-05 ASSESSMENT — LID POSITION - LOWER LID LAG: OS_LOWER_LID_LAG: LLL 1+

## 2023-07-17 ENCOUNTER — APPOINTMENT (OUTPATIENT)
Dept: CARDIOLOGY | Facility: CLINIC | Age: 83
End: 2023-07-17

## 2023-10-26 ENCOUNTER — NON-APPOINTMENT (OUTPATIENT)
Age: 83
End: 2023-10-26

## 2023-10-26 ENCOUNTER — APPOINTMENT (OUTPATIENT)
Dept: CARDIOLOGY | Facility: CLINIC | Age: 83
End: 2023-10-26
Payer: MEDICARE

## 2023-10-26 VITALS
SYSTOLIC BLOOD PRESSURE: 128 MMHG | BODY MASS INDEX: 40.85 KG/M2 | HEIGHT: 62 IN | RESPIRATION RATE: 14 BRPM | DIASTOLIC BLOOD PRESSURE: 78 MMHG | HEART RATE: 81 BPM | WEIGHT: 222 LBS

## 2023-10-26 DIAGNOSIS — E66.01 MORBID (SEVERE) OBESITY DUE TO EXCESS CALORIES: ICD-10-CM

## 2023-10-26 DIAGNOSIS — M54.41 LUMBAGO WITH SCIATICA, LEFT SIDE: ICD-10-CM

## 2023-10-26 DIAGNOSIS — G89.29 LUMBAGO WITH SCIATICA, LEFT SIDE: ICD-10-CM

## 2023-10-26 DIAGNOSIS — E78.00 PURE HYPERCHOLESTEROLEMIA, UNSPECIFIED: ICD-10-CM

## 2023-10-26 DIAGNOSIS — M54.42 LUMBAGO WITH SCIATICA, LEFT SIDE: ICD-10-CM

## 2023-10-26 PROCEDURE — 93306 TTE W/DOPPLER COMPLETE: CPT

## 2023-10-26 PROCEDURE — 93000 ELECTROCARDIOGRAM COMPLETE: CPT

## 2023-10-26 PROCEDURE — 99214 OFFICE O/P EST MOD 30 MIN: CPT

## 2023-10-26 RX ORDER — PERFLUTREN 6.52 MG/ML
6.52 INJECTION, SUSPENSION INTRAVENOUS
Qty: 1 | Refills: 0 | Status: COMPLETED | OUTPATIENT
Start: 2023-10-26

## 2023-10-26 RX ADMIN — PERFLUTREN MG/ML: 6.52 INJECTION, SUSPENSION INTRAVENOUS at 00:00

## 2024-04-29 ENCOUNTER — APPOINTMENT (OUTPATIENT)
Dept: CARDIOLOGY | Facility: CLINIC | Age: 84
End: 2024-04-29
Payer: MEDICARE

## 2024-04-29 ENCOUNTER — NON-APPOINTMENT (OUTPATIENT)
Age: 84
End: 2024-04-29

## 2024-04-29 VITALS
HEART RATE: 92 BPM | HEIGHT: 62 IN | RESPIRATION RATE: 15 BRPM | SYSTOLIC BLOOD PRESSURE: 116 MMHG | DIASTOLIC BLOOD PRESSURE: 62 MMHG | BODY MASS INDEX: 40.12 KG/M2 | WEIGHT: 218 LBS

## 2024-04-29 DIAGNOSIS — Z86.79 PERSONAL HISTORY OF OTHER DISEASES OF THE CIRCULATORY SYSTEM: ICD-10-CM

## 2024-04-29 DIAGNOSIS — I65.29 OCCLUSION AND STENOSIS OF UNSPECIFIED CAROTID ARTERY: ICD-10-CM

## 2024-04-29 DIAGNOSIS — R06.09 OTHER FORMS OF DYSPNEA: ICD-10-CM

## 2024-04-29 DIAGNOSIS — R60.0 LOCALIZED EDEMA: ICD-10-CM

## 2024-04-29 PROCEDURE — 99214 OFFICE O/P EST MOD 30 MIN: CPT

## 2024-04-29 PROCEDURE — G2211 COMPLEX E/M VISIT ADD ON: CPT

## 2024-04-29 PROCEDURE — 93000 ELECTROCARDIOGRAM COMPLETE: CPT

## 2024-04-29 RX ORDER — MELOXICAM 15 MG/1
15 TABLET ORAL DAILY
Qty: 90 | Refills: 3 | Status: DISCONTINUED | COMMUNITY
Start: 2016-08-15 | End: 2024-04-29

## 2024-04-29 NOTE — HISTORY OF PRESENT ILLNESS
[FreeTextEntry1] : The patient's wife reports a story a few weeks ago when he was apparently going down to the basement and checking something with the  and apparently had fallen and was having trouble getting up.  She went looking for him in a little while and discovered him in the basement where he stated that he hurt his leg and was trying to crawl on all fours but did not remember blacking out but may be just lost his balance; subsequently, when he finally came upstairs and sat in the kitchen he had a syncopal-like event where she said he became unresponsive for a few seconds and was kind of cool to the extremities and face.  Then gradually patient started to feel better;  She did not seek any additional attention at that time and he has had no further such episodes;   Most recent Transthoracic echo done earlier today (10/26/2023) shows preserved cardiac chamber sizes with normal systolic function of the left ventricle and EF of 60 to 65%. There is mild diastolic function. There is some enlargement of the left atrium noted. There is calcification of the mitral valve but it otherwise opens normally with trace MR and PI.  There is no pericardial effusion noted. (no significant interval change when compared to prior study dated (9/6/2022)).    Carotid duplex study from September 6, 2022 demonstrates bilateral mild calcified and heterogeneous plaquing without any significant obstructive disease.;

## 2024-04-29 NOTE — PHYSICAL EXAM
[No Acute Distress] : no acute distress [Obese] : obese [Normal Conjunctiva] : normal conjunctiva [Normal Venous Pressure] : normal venous pressure [No Carotid Bruit] : no carotid bruit [Normal S1, S2] : normal S1, S2 [No Rub] : no rub [No Gallop] : no gallop [Murmur] : murmur [Clear Lung Fields] : clear lung fields [No Respiratory Distress] : no respiratory distress  [Soft] : abdomen soft [Non Tender] : non-tender [No Masses/organomegaly] : no masses/organomegaly [No Cyanosis] : no cyanosis [No Clubbing] : no clubbing [No Rash] : no rash [No Skin Lesions] : no skin lesions [Moves all extremities] : moves all extremities [No Focal Deficits] : no focal deficits [Normal Speech] : normal speech [Alert and Oriented] : alert and oriented [Normal memory] : normal memory [de-identified] : Grade I/VI systolic murmur [de-identified] : Mostly clear breath sounds but slightly diminished; [de-identified] : ambulates slowly with walker [de-identified] : Some excoriated areas of the anterior tibial lower leg abrasions with only trace ankle edema;

## 2024-04-29 NOTE — ASSESSMENT
[FreeTextEntry1] : EKG shows normal sinus rhythm at a rate of 92 bpm; borderline nonspecific T wave changes. early R wave transitions V1 to V2; leftward axis; Essentially unchanged.    In summary this 83-year-old gentleman with history for pulmonary hypertension, underlying obstructive sleep apnea and possible COPD with occasional complaints of exertional dyspnea but very sedentary lifestyle;  Also recent fall unwitnessed at home in the basement possibly loss of balance and subsequently's near syncopal or syncopal event while sitting back in the kitchen a little while later with his wife; Otherwise, appears hemodynamically stable;  Exertional dyspnea could be multifactorial and contributing factors are obesity and cardiovascular deconditioning as well as some arthritic complaints of the lower extremities and lower spine. Patient ambulates slowly with a walker;  Otherwise hemodynamically stable; blood pressure well controlled in the low normal range;  Has follow-up with nephrologist in the near future to reassess some of his medications such as the torsemide and lisinopril with respect to his renal function; agree with cutting back on torsemide if suggested by renal; or even lisinopril if needed;   Apparently, patient's Pulmonologist (Dr. Thrasher) suggested he begin taking Budesonide Nebulizers BID for respiratory failure with hypoxia, but unfortunately, he has not yet begun to do this;      PLAN:      1). Patient and patient's wife reassured with stable cardiac pattern with most recent echocardiogram stable with no significant changes from prior study and demonstrates a normal overall systolic function;      2). Recommend he begin taking Budesonide Nebulizers BID as suggested by his Pulmonologist in hopes of improving his overall breathing and pulse Ox on room air.    3). Diet and lifestyle modification discussed including low sodium, low fat and low carbohydrate weight reducing diet. Keep well PO hydrated.    4). Follow up with PCP (Dr. Ruffin) regarding routine checkups and blood work. Forward all testing/lab work to our office.    5). Recommend patient report any untoward symptoms. No additional cardiac testing indicated at this time.     6). Follow up with Dr. Sanchez within 5 to 6 months or as needed;.

## 2024-04-29 NOTE — REASON FOR VISIT
[FreeTextEntry1] : SHAISTA FELDER is being seen for symptom and test evaluation arrhythmia/ECG abnormalities. Patient accompanied by spouse.   The patient is an 83-year-old white male mostly [place] speaking who presents back to the office for general cardiac checkup;  He's been followed for a history of early dementia, exertional dyspnea, borderline abnormal EKG and known history for pulmonary hypertension, GINETTE (could not tolerate CPAP), respiratory failure with hypoxia (follows Pulmonologist- Dr. Thrasher);  His main complaints according to the patient and their daughter who accompanied him today is basically arthritides of the lower extremities and feet.  He has been ambulating with a walker/roller and there is been no recent significant chest pain, shortness of breath, palpitations or dizziness;

## 2024-09-17 ENCOUNTER — NON-APPOINTMENT (OUTPATIENT)
Age: 84
End: 2024-09-17

## 2024-09-17 ENCOUNTER — APPOINTMENT (OUTPATIENT)
Dept: CARDIOLOGY | Facility: CLINIC | Age: 84
End: 2024-09-17
Payer: MEDICARE

## 2024-09-17 VITALS
BODY MASS INDEX: 40.48 KG/M2 | RESPIRATION RATE: 16 BRPM | DIASTOLIC BLOOD PRESSURE: 62 MMHG | HEIGHT: 62 IN | SYSTOLIC BLOOD PRESSURE: 108 MMHG | HEART RATE: 83 BPM | WEIGHT: 220 LBS

## 2024-09-17 DIAGNOSIS — G89.29 LUMBAGO WITH SCIATICA, LEFT SIDE: ICD-10-CM

## 2024-09-17 DIAGNOSIS — I65.29 OCCLUSION AND STENOSIS OF UNSPECIFIED CAROTID ARTERY: ICD-10-CM

## 2024-09-17 DIAGNOSIS — M54.42 LUMBAGO WITH SCIATICA, LEFT SIDE: ICD-10-CM

## 2024-09-17 DIAGNOSIS — M54.41 LUMBAGO WITH SCIATICA, LEFT SIDE: ICD-10-CM

## 2024-09-17 DIAGNOSIS — Z86.79 PERSONAL HISTORY OF OTHER DISEASES OF THE CIRCULATORY SYSTEM: ICD-10-CM

## 2024-09-17 DIAGNOSIS — E78.5 HYPERLIPIDEMIA, UNSPECIFIED: ICD-10-CM

## 2024-09-17 DIAGNOSIS — G47.33 OBSTRUCTIVE SLEEP APNEA (ADULT) (PEDIATRIC): ICD-10-CM

## 2024-09-17 DIAGNOSIS — R60.0 LOCALIZED EDEMA: ICD-10-CM

## 2024-09-17 PROCEDURE — 99214 OFFICE O/P EST MOD 30 MIN: CPT

## 2024-09-17 PROCEDURE — 93000 ELECTROCARDIOGRAM COMPLETE: CPT

## 2024-09-17 PROCEDURE — G2211 COMPLEX E/M VISIT ADD ON: CPT

## 2024-09-17 NOTE — PHYSICAL EXAM
[No Acute Distress] : no acute distress [Obese] : obese [Normal Conjunctiva] : normal conjunctiva [Normal Venous Pressure] : normal venous pressure [No Carotid Bruit] : no carotid bruit [Normal S1, S2] : normal S1, S2 [No Rub] : no rub [No Gallop] : no gallop [Murmur] : murmur [Clear Lung Fields] : clear lung fields [No Respiratory Distress] : no respiratory distress  [Soft] : abdomen soft [Non Tender] : non-tender [No Masses/organomegaly] : no masses/organomegaly [No Cyanosis] : no cyanosis [No Clubbing] : no clubbing [No Rash] : no rash [No Skin Lesions] : no skin lesions [Moves all extremities] : moves all extremities [No Focal Deficits] : no focal deficits [Normal Speech] : normal speech [Alert and Oriented] : alert and oriented [Normal memory] : normal memory [de-identified] : Grade II/VI systolic murmur [de-identified] : Mostly clear breath sounds but slightly diminished; [de-identified] : ambulates slowly with walker [de-identified] : Some excoriated areas of the anterior tibial lower leg abrasions with only trace ankle edema;

## 2024-09-17 NOTE — HISTORY OF PRESENT ILLNESS
[FreeTextEntry1] : Fortunately, he has had no further falling episodes such as reported earlier in the spring.  He has been more stable and walking with his walker;  Getting regular checkups and laboratory blood test with primary care and also with nephrology for history of mild chronic renal insufficiency;  Most recent Transthoracic echo done earlier today (10/26/2023) shows preserved cardiac chamber sizes with normal systolic function of the left ventricle and EF of 60 to 65%. There is mild diastolic function. There is some enlargement of the left atrium noted. There is calcification of the mitral valve but it otherwise opens normally with trace MR and PI.  There is no pericardial effusion noted. (no significant interval change when compared to prior study dated (9/6/2022)).     Carotid duplex study from September 6, 2022 demonstrates bilateral mild calcified and heterogeneous plaquing without any significant obstructive disease.;

## 2024-09-17 NOTE — REASON FOR VISIT
[FreeTextEntry1] : SHAISTA FELDER is being seen for symptom and test evaluation arrhythmia/ECG abnormalities. Patient accompanied by spouse.   The patient is an 84-year-old white male mostly Citizen of the [place] speaking who presents back to the office for general cardiac checkup;  He's been followed for a history of early dementia, exertional dyspnea, borderline abnormal EKG and known history for pulmonary hypertension, GINETTE (could not tolerate CPAP), respiratory failure with hypoxia (follows Pulmonologist- Dr. Thrasher);  His main complaints according to the patient and their daughter who accompanied him today is basically arthritides of the lower extremities and feet.  He has been ambulating with a walker/roller and there is been no recent significant chest pain, shortness of breath, palpitations or dizziness;

## 2024-09-17 NOTE — ASSESSMENT
[FreeTextEntry1] : EKG shows normal sinus rhythm at a rate of 83 bpm; borderline nonspecific T wave changes. early R wave transitions V1 to V2; leftward axis; Essentially unchanged.    In summary this 84-year-old gentleman with history for pulmonary hypertension, underlying obstructive sleep apnea and possible COPD with occasional complaints of exertional dyspnea but very sedentary lifestyle;  Fortunately, he has had no further falling episodes such as reported last spring;  Occasional exertional dyspnea could be multifactorial and contributing factors are obesity and cardiovascular deconditioning as well as some arthritic complaints of the lower extremities and lower spine. Patient ambulates slowly with a walker;  Otherwise hemodynamically stable; blood pressure well controlled in the low normal range;  Has follow-up with nephrologist in the near future to reassess some of his medications such as the torsemide and lisinopril with respect to his renal function; agree with cutting back on torsemide if suggested by renal; or even lisinopril if needed;     PLAN:   1). Patient and patient's wife reassured with stable cardiac pattern with most recent echocardiogram stable with no significant changes from prior study and demonstrates a normal overall systolic function;   2).  Could consider further pulmonary evaluation if breathing coughing or wheezing returns; (note patient not using inhaler at this time)  3).  Continue if possible low sodium, low fat and low carbohydrate weight reducing diet. Keep well PO hydrated.  4). Follow up with PCP (Dr. Ruffin) regarding routine checkups and blood work. Forward all testing/lab work to our office.  5). Recommend carotid duplex study to assess carotid plaquing stenosis and echocardiogram by next visit within 5 to 6 months;

## 2025-02-24 ENCOUNTER — APPOINTMENT (OUTPATIENT)
Dept: CARDIOLOGY | Facility: CLINIC | Age: 85
End: 2025-02-24
Payer: MEDICARE

## 2025-02-24 PROCEDURE — 93880 EXTRACRANIAL BILAT STUDY: CPT

## 2025-02-24 PROCEDURE — 93306 TTE W/DOPPLER COMPLETE: CPT

## 2025-03-10 ENCOUNTER — APPOINTMENT (OUTPATIENT)
Dept: CARDIOLOGY | Facility: CLINIC | Age: 85
End: 2025-03-10
Payer: MEDICARE

## 2025-03-10 ENCOUNTER — NON-APPOINTMENT (OUTPATIENT)
Age: 85
End: 2025-03-10

## 2025-03-10 VITALS
RESPIRATION RATE: 16 BRPM | SYSTOLIC BLOOD PRESSURE: 132 MMHG | HEIGHT: 62 IN | WEIGHT: 219 LBS | DIASTOLIC BLOOD PRESSURE: 66 MMHG | BODY MASS INDEX: 40.3 KG/M2 | HEART RATE: 97 BPM

## 2025-03-10 DIAGNOSIS — R01.1 CARDIAC MURMUR, UNSPECIFIED: ICD-10-CM

## 2025-03-10 DIAGNOSIS — E78.5 HYPERLIPIDEMIA, UNSPECIFIED: ICD-10-CM

## 2025-03-10 DIAGNOSIS — Z86.79 PERSONAL HISTORY OF OTHER DISEASES OF THE CIRCULATORY SYSTEM: ICD-10-CM

## 2025-03-10 DIAGNOSIS — R06.09 OTHER FORMS OF DYSPNEA: ICD-10-CM

## 2025-03-10 DIAGNOSIS — I65.29 OCCLUSION AND STENOSIS OF UNSPECIFIED CAROTID ARTERY: ICD-10-CM

## 2025-03-10 PROCEDURE — 99214 OFFICE O/P EST MOD 30 MIN: CPT

## 2025-03-10 PROCEDURE — G2211 COMPLEX E/M VISIT ADD ON: CPT

## 2025-03-10 PROCEDURE — 93000 ELECTROCARDIOGRAM COMPLETE: CPT

## 2025-09-09 ENCOUNTER — APPOINTMENT (OUTPATIENT)
Dept: CARDIOLOGY | Facility: CLINIC | Age: 85
End: 2025-09-09
Payer: MEDICARE

## 2025-09-09 ENCOUNTER — NON-APPOINTMENT (OUTPATIENT)
Age: 85
End: 2025-09-09

## 2025-09-09 VITALS
DIASTOLIC BLOOD PRESSURE: 60 MMHG | RESPIRATION RATE: 18 BRPM | OXYGEN SATURATION: 97 % | HEART RATE: 87 BPM | SYSTOLIC BLOOD PRESSURE: 100 MMHG

## 2025-09-09 DIAGNOSIS — Z86.79 PERSONAL HISTORY OF OTHER DISEASES OF THE CIRCULATORY SYSTEM: ICD-10-CM

## 2025-09-09 DIAGNOSIS — E78.00 PURE HYPERCHOLESTEROLEMIA, UNSPECIFIED: ICD-10-CM

## 2025-09-09 DIAGNOSIS — E66.01 MORBID (SEVERE) OBESITY DUE TO EXCESS CALORIES: ICD-10-CM

## 2025-09-09 DIAGNOSIS — G47.33 OBSTRUCTIVE SLEEP APNEA (ADULT) (PEDIATRIC): ICD-10-CM

## 2025-09-09 DIAGNOSIS — R06.09 OTHER FORMS OF DYSPNEA: ICD-10-CM

## 2025-09-09 DIAGNOSIS — I65.29 OCCLUSION AND STENOSIS OF UNSPECIFIED CAROTID ARTERY: ICD-10-CM

## 2025-09-09 PROCEDURE — 99213 OFFICE O/P EST LOW 20 MIN: CPT | Mod: 25

## 2025-09-09 PROCEDURE — 93000 ELECTROCARDIOGRAM COMPLETE: CPT
